# Patient Record
Sex: MALE | Race: WHITE
[De-identification: names, ages, dates, MRNs, and addresses within clinical notes are randomized per-mention and may not be internally consistent; named-entity substitution may affect disease eponyms.]

---

## 2017-01-20 ENCOUNTER — PRIOR ORIGINAL RECORDS (OUTPATIENT)
Dept: OTHER | Age: 67
End: 2017-01-20

## 2017-07-21 ENCOUNTER — LABORATORY ENCOUNTER (OUTPATIENT)
Dept: LAB | Age: 67
End: 2017-07-21
Attending: INTERNAL MEDICINE
Payer: MEDICARE

## 2017-07-21 ENCOUNTER — PRIOR ORIGINAL RECORDS (OUTPATIENT)
Dept: OTHER | Age: 67
End: 2017-07-21

## 2017-07-21 ENCOUNTER — HOSPITAL ENCOUNTER (OUTPATIENT)
Dept: GENERAL RADIOLOGY | Age: 67
Discharge: HOME | End: 2017-07-21
Attending: INTERNAL MEDICINE
Payer: MEDICARE

## 2017-07-21 DIAGNOSIS — E78.00 PURE HYPERCHOLESTEROLEMIA: Primary | ICD-10-CM

## 2017-07-21 DIAGNOSIS — R06.00 DYSPNEA, UNSPECIFIED TYPE: ICD-10-CM

## 2017-07-21 LAB
ALBUMIN SERPL-MCNC: 3.7 G/DL (ref 3.5–4.8)
ALP LIVER SERPL-CCNC: 99 U/L (ref 45–117)
ALT SERPL-CCNC: 35 U/L (ref 17–63)
AST SERPL-CCNC: 33 U/L (ref 15–41)
BILIRUB SERPL-MCNC: 0.6 MG/DL (ref 0.1–2)
BUN BLD-MCNC: 28 MG/DL (ref 8–20)
CALCIUM BLD-MCNC: 9.6 MG/DL (ref 8.3–10.3)
CHLORIDE: 105 MMOL/L (ref 101–111)
CHOLEST SMN-MCNC: 154 MG/DL (ref ?–200)
CO2: 26 MMOL/L (ref 22–32)
CREAT BLD-MCNC: 1.29 MG/DL (ref 0.7–1.3)
ERYTHROCYTE [DISTWIDTH] IN BLOOD BY AUTOMATED COUNT: 17.7 % (ref 11.5–16)
GLUCOSE BLD-MCNC: 168 MG/DL (ref 70–99)
HCT VFR BLD AUTO: 32.5 % (ref 37–53)
HDLC SERPL-MCNC: 22 MG/DL (ref 45–?)
HDLC SERPL: 7 {RATIO} (ref ?–4.97)
HGB BLD-MCNC: 9.5 G/DL (ref 13–17)
LDLC SERPL DIRECT ASSAY-MCNC: 81 MG/DL (ref ?–100)
M PROTEIN MFR SERPL ELPH: 7.5 G/DL (ref 6.1–8.3)
MCH RBC QN AUTO: 31.4 PG (ref 27–33.2)
MCHC RBC AUTO-ENTMCNC: 29.2 G/DL (ref 31–37)
MCV RBC AUTO: 107.3 FL (ref 80–99)
NEUTROPHIL ABS PRELIM: 78 X10 (3) UL (ref 1.3–6.7)
NONHDLC SERPL-MCNC: 132 MG/DL (ref ?–130)
PLATELET # BLD AUTO: 117 10(3)UL (ref 150–450)
POTASSIUM SERPL-SCNC: 4.3 MMOL/L (ref 3.6–5.1)
RBC # BLD AUTO: 3.03 X10(6)UL (ref 3.8–5.8)
RED CELL DISTRIBUTION WIDTH-SD: 69 FL (ref 35.1–46.3)
SODIUM SERPL-SCNC: 139 MMOL/L (ref 136–144)
TRIGLYCERIDES: 485 MG/DL (ref ?–150)
WBC # BLD AUTO: 128.1 X10(3) UL (ref 4–13)

## 2017-07-21 PROCEDURE — 71020 XR CHEST PA + LAT CHEST (CPT=71020): CPT | Performed by: INTERNAL MEDICINE

## 2017-07-21 PROCEDURE — 85007 BL SMEAR W/DIFF WBC COUNT: CPT

## 2017-07-21 PROCEDURE — 80053 COMPREHEN METABOLIC PANEL: CPT

## 2017-07-21 PROCEDURE — 80061 LIPID PANEL: CPT

## 2017-07-21 PROCEDURE — 36415 COLL VENOUS BLD VENIPUNCTURE: CPT

## 2017-07-21 PROCEDURE — 83721 ASSAY OF BLOOD LIPOPROTEIN: CPT

## 2017-07-21 PROCEDURE — 85027 COMPLETE CBC AUTOMATED: CPT

## 2017-07-21 PROCEDURE — 85025 COMPLETE CBC W/AUTO DIFF WBC: CPT

## 2017-07-23 LAB
BAND %: 28 %
BASOPHIL % MANUAL: 3 %
BASOPHIL ABSOLUTE MANUAL: 3.84 X10(3) UL (ref 0–0.1)
BLAST %: 1 %
BLAST ABSOLUTE MANUAL: 1.28 X10(3) UL (ref ?–0.01)
EOSINOPHIL % MANUAL: 1 %
EOSINOPHIL ABSOLUTE MANUAL: 1.28 X10(3) UL (ref 0–0.3)
LYMPHOCYTE % MANUAL: 5 %
LYMPHOCYTE ABSOLUTE MANUAL: 6.41 X10(3) UL (ref 0.9–4)
METAMYELOCYTE %: 9 %
METAMYELOCYTE ABSOLUTE MANUAL: 11.53 X10(3) UL (ref ?–0.01)
MONOCYTE % MANUAL: 9 %
MONOCYTE ABSOLUTE MANUAL: 11.53 X10(3) UL (ref 0.1–0.6)
MYELOCYTE %: 4 %
MYELOCYTE ABSOLUTE MANUAL: 5.12 X10(3) UL (ref ?–0.01)
NEUTROPHIL ABSOLUTE MANUAL: 87.11 X10(3) UL (ref 1.3–6.7)
NEUTROPHILS % MANUAL: 40 %
NRBC CALCULATED: 2
PLATELET MORPHOLOGY: NORMAL
TOTAL CELLS COUNTED: 100
TOXIC GRANULATION: PRESENT

## 2017-07-24 ENCOUNTER — PRIOR ORIGINAL RECORDS (OUTPATIENT)
Dept: OTHER | Age: 67
End: 2017-07-24

## 2017-07-28 ENCOUNTER — OFFICE VISIT (OUTPATIENT)
Dept: HEMATOLOGY/ONCOLOGY | Facility: HOSPITAL | Age: 67
End: 2017-07-28
Attending: SPECIALIST
Payer: MEDICARE

## 2017-07-28 VITALS
RESPIRATION RATE: 18 BRPM | HEIGHT: 70.98 IN | WEIGHT: 281.63 LBS | TEMPERATURE: 97 F | OXYGEN SATURATION: 95 % | HEART RATE: 81 BPM | BODY MASS INDEX: 39.43 KG/M2 | DIASTOLIC BLOOD PRESSURE: 76 MMHG | SYSTOLIC BLOOD PRESSURE: 150 MMHG

## 2017-07-28 DIAGNOSIS — C92.10 CML (CHRONIC MYELOCYTIC LEUKEMIA) (HCC): Primary | ICD-10-CM

## 2017-07-28 DIAGNOSIS — D69.6 THROMBOCYTOPENIA (HCC): ICD-10-CM

## 2017-07-28 DIAGNOSIS — D72.821 MONOCYTOSIS: ICD-10-CM

## 2017-07-28 DIAGNOSIS — C95.90 LEUKEMIA CONSULTATION (HCC): ICD-10-CM

## 2017-07-28 DIAGNOSIS — Z71.89 LEUKEMIA CONSULTATION (HCC): ICD-10-CM

## 2017-07-28 DIAGNOSIS — D72.824 BASOPHILIA: ICD-10-CM

## 2017-07-28 LAB
ALBUMIN SERPL-MCNC: 3.8 G/DL (ref 3.5–4.8)
ALP LIVER SERPL-CCNC: 99 U/L (ref 45–117)
ALT SERPL-CCNC: 39 U/L (ref 17–63)
AST SERPL-CCNC: 43 U/L (ref 15–41)
BAND %: 27 %
BASOPHIL % MANUAL: 1 %
BASOPHIL ABSOLUTE MANUAL: 1.44 X10(3) UL (ref 0–0.1)
BILIRUB SERPL-MCNC: 0.6 MG/DL (ref 0.1–2)
BLAST %: 2 %
BLAST ABSOLUTE MANUAL: 2.87 X10(3) UL (ref ?–0.01)
BUN BLD-MCNC: 21 MG/DL (ref 8–20)
CALCIUM BLD-MCNC: 9.5 MG/DL (ref 8.3–10.3)
CHLORIDE: 106 MMOL/L (ref 101–111)
CO2: 29 MMOL/L (ref 22–32)
CREAT BLD-MCNC: 1.21 MG/DL (ref 0.7–1.3)
EOSINOPHIL % MANUAL: 2 %
EOSINOPHIL ABSOLUTE MANUAL: 2.87 X10(3) UL (ref 0–0.3)
ERYTHROCYTE [DISTWIDTH] IN BLOOD BY AUTOMATED COUNT: 17.3 % (ref 11.5–16)
GLUCOSE BLD-MCNC: 108 MG/DL (ref 70–99)
HCT VFR BLD AUTO: 30.6 % (ref 37–53)
HGB BLD-MCNC: 9.5 G/DL (ref 13–17)
LDH: 794 U/L (ref 84–249)
LYMPHOCYTE % MANUAL: 3 %
LYMPHOCYTE ABSOLUTE MANUAL: 4.31 X10(3) UL (ref 0.9–4)
M PROTEIN MFR SERPL ELPH: 7.5 G/DL (ref 6.1–8.3)
MCH RBC QN AUTO: 31.1 PG (ref 27–33.2)
MCHC RBC AUTO-ENTMCNC: 31 G/DL (ref 31–37)
MCV RBC AUTO: 100.3 FL (ref 80–99)
METAMYELOCYTE %: 7 %
METAMYELOCYTE ABSOLUTE MANUAL: 10.05 X10(3) UL (ref ?–0.01)
MONOCYTE % MANUAL: 3 %
MONOCYTE ABSOLUTE MANUAL: 4.31 X10(3) UL (ref 0.1–0.6)
MYELOCYTE %: 15 %
MYELOCYTE ABSOLUTE MANUAL: 21.53 X10(3) UL (ref ?–0.01)
NEUTROPHIL ABS PRELIM: 85.67 X10 (3) UL (ref 1.3–6.7)
NEUTROPHIL ABSOLUTE MANUAL: 96.15 X10(3) UL (ref 1.3–6.7)
NEUTROPHILS % MANUAL: 40 %
NRBC CALCULATED: 2
PLATELET # BLD AUTO: 106 10(3)UL (ref 150–450)
PLATELET MORPHOLOGY: NORMAL
POTASSIUM SERPL-SCNC: 4.1 MMOL/L (ref 3.6–5.1)
RBC # BLD AUTO: 3.05 X10(6)UL (ref 3.8–5.8)
RED CELL DISTRIBUTION WIDTH-SD: 63.3 FL (ref 35.1–46.3)
SODIUM SERPL-SCNC: 141 MMOL/L (ref 136–144)
TOTAL CELLS COUNTED: 100
URIC ACID: 8.9 MG/DL (ref 2.4–8.7)
WBC # BLD AUTO: 143.5 X10(3) UL (ref 4–13)

## 2017-07-28 PROCEDURE — 99205 OFFICE O/P NEW HI 60 MIN: CPT | Performed by: SPECIALIST

## 2017-07-28 PROCEDURE — 38220 DX BONE MARROW ASPIRATIONS: CPT | Performed by: SPECIALIST

## 2017-07-28 RX ORDER — ALLOPURINOL 300 MG/1
300 TABLET ORAL DAILY
Qty: 30 TABLET | Refills: 0 | Status: SHIPPED | COMMUNITY
Start: 2017-07-28 | End: 2017-09-28

## 2017-07-28 RX ORDER — HYDROXYUREA 500 MG/1
1000 CAPSULE ORAL DAILY
Qty: 60 CAPSULE | Refills: 0 | Status: SHIPPED | COMMUNITY
Start: 2017-07-28 | End: 2017-07-28

## 2017-07-28 RX ORDER — CARVEDILOL 6.25 MG/1
12.5 TABLET ORAL 2 TIMES DAILY WITH MEALS
COMMUNITY
End: 2019-01-22 | Stop reason: ALTCHOICE

## 2017-07-28 RX ORDER — HYDROXYUREA 500 MG/1
500 CAPSULE ORAL DAILY
Qty: 30 CAPSULE | Refills: 0 | Status: SHIPPED | COMMUNITY
Start: 2017-07-28 | End: 2017-09-07 | Stop reason: ALTCHOICE

## 2017-07-28 RX ORDER — MULTIVIT-MIN/FOLIC ACID/LUTEIN 400-250MCG
1 TABLET,CHEWABLE ORAL DAILY
COMMUNITY

## 2017-07-28 NOTE — PROGRESS NOTES
Patient is here today for Consult with Josue Flower. Patient denies pain. Medication list and medical history were reviewed and updated.     Education Record    Learner:  Patient and Family Member Sury Vicente    Disease / Diagnosis: Consult    Aminata / Hakeem Kay

## 2017-07-30 PROBLEM — Z71.89 LEUKEMIA CONSULTATION (HCC): Status: ACTIVE | Noted: 2017-07-30

## 2017-07-30 PROBLEM — C95.90 LEUKEMIA CONSULTATION (HCC): Status: ACTIVE | Noted: 2017-07-30

## 2017-07-30 NOTE — PROGRESS NOTES
[facility] Report of Consultation      Patient Name: Nagi Salinas   YOB: 1950  Medical Record Number: [id number]  Consulting Physician: Dino Parson. Isidoro Dos Santos M.D. Referring Physician: Chris Alcantar M.D.     Date of Consultation: Disp: 30 tablet Rfl: 0   hydroxyurea 500 MG Oral Cap Take 1 capsule (500 mg total) by mouth daily. Disp: 30 capsule Rfl: 0   Venlafaxine HCl ER (EFFEXOR-XR) 150 MG Oral Capsule SR 24 Hr Take 300 mg by mouth daily.  Disp:  Rfl:    Pravastatin Sodium [facility] Conjunctiva clear; sclera anicteric. ENMT                 External nose normal; external ears normal.  Neck                   Supple, without masses.   Hematologic/Lymphatic No cervical, supraclavicular, axillary or inguinal lymphadenopathy; no p RDW 17.3 (H) 11.5 - 16.0 %   RDW-SD 63.3 (H) 35.1 - 46.3 fL   Neutrophil Absolute Prelim 85.67 (H) 1.30 - 6.70 x10 (3) uL   -MANUAL DIFFERENTIAL   Collection Time: 07/28/17  3:56 PM   Result Value Ref Range   Neutrophil Absolute Manual 96.15 (H) 1.30 - 6 BCR/ABL with reflex to quantitative PCR ordered. Bone marrow biopsy with cytogenetics and FISH performed today. Start hydroxyurea 500 mg daily as white blood cell count is greater than 80 K/mcl.  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

## 2017-07-30 NOTE — PROGRESS NOTES
[facility] Bone Marrow Biopsy Procedure Note      Patient Name: Lety Rosario   YOB: 1950  Medical Record Number: [id number]  Attending Physician: Sarah Mack M.D.        Date of Procedure: 7/28/2017      Narrative  Patient p

## 2017-08-01 LAB
ALBUMIN: 3.7 G/DL
ALKALINE PHOSPHATATE(ALK PHOS): 99 IU/L
BILIRUBIN TOTAL: 0.6 MG/DL
BUN: 28 MG/DL
CALCIUM: 9.6 MG/DL
CHLORIDE: 105 MEQ/L
CHOLESTEROL, TOTAL: 154 MG/DL
CREATININE, SERUM: 1.29 MG/DL
GLUCOSE: 168 MG/DL
HDL CHOLESTEROL: 22 MG/DL
HEMATOCRIT: 32.5 %
HEMOGLOBIN: 9.5 G/DL
PLATELETS: 117 K/UL
POTASSIUM, SERUM: 4.3 MEQ/L
PROTEIN, TOTAL: 7.5 G/DL
RED BLOOD COUNT: 3.03 X 10-6/U
SGOT (AST): 33 IU/L
SGPT (ALT): 35 IU/L
SODIUM: 139 MEQ/L
TRIGLYCERIDES: 485 MG/DL
WHITE BLOOD COUNT: 128.1 X 10-3/U

## 2017-08-04 ENCOUNTER — TELEPHONE (OUTPATIENT)
Dept: HEMATOLOGY/ONCOLOGY | Facility: HOSPITAL | Age: 67
End: 2017-08-04

## 2017-08-04 ENCOUNTER — LAB ENCOUNTER (OUTPATIENT)
Dept: LAB | Age: 67
End: 2017-08-04
Attending: FAMILY MEDICINE
Payer: MEDICARE

## 2017-08-04 DIAGNOSIS — D72.824 BASOPHILIC LEUKOCYTOSIS: ICD-10-CM

## 2017-08-04 LAB
ALBUMIN SERPL-MCNC: 3.7 G/DL (ref 3.5–4.8)
ALP LIVER SERPL-CCNC: 96 U/L (ref 45–117)
ALT SERPL-CCNC: 33 U/L (ref 17–63)
AST SERPL-CCNC: 41 U/L (ref 15–41)
BAND %: 26 %
BASOPHIL % MANUAL: 0 %
BASOPHIL ABSOLUTE MANUAL: 0 X10(3) UL (ref 0–0.1)
BILIRUB SERPL-MCNC: 0.6 MG/DL (ref 0.1–2)
BUN BLD-MCNC: 18 MG/DL (ref 8–20)
CALCIUM BLD-MCNC: 9.5 MG/DL (ref 8.3–10.3)
CHLORIDE: 106 MMOL/L (ref 101–111)
CO2: 27 MMOL/L (ref 22–32)
CREAT BLD-MCNC: 1.11 MG/DL (ref 0.7–1.3)
EOSINOPHIL % MANUAL: 0 %
EOSINOPHIL ABSOLUTE MANUAL: 0 X10(3) UL (ref 0–0.3)
ERYTHROCYTE [DISTWIDTH] IN BLOOD BY AUTOMATED COUNT: 17.7 % (ref 11.5–16)
GLUCOSE BLD-MCNC: 132 MG/DL (ref 70–99)
HCT VFR BLD AUTO: 30.3 % (ref 37–53)
HGB BLD-MCNC: 9.1 G/DL (ref 13–17)
LYMPHOCYTE % MANUAL: 2 %
LYMPHOCYTE ABSOLUTE MANUAL: 2.83 X10(3) UL (ref 0.9–4)
M PROTEIN MFR SERPL ELPH: 7.6 G/DL (ref 6.1–8.3)
MCH RBC QN AUTO: 31.3 PG (ref 27–33.2)
MCHC RBC AUTO-ENTMCNC: 30 G/DL (ref 31–37)
MCV RBC AUTO: 104.1 FL (ref 80–99)
METAMYELOCYTE %: 9 %
METAMYELOCYTE ABSOLUTE MANUAL: 12.75 X10(3) UL (ref ?–0.01)
MONOCYTE % MANUAL: 3 %
MONOCYTE ABSOLUTE MANUAL: 4.25 X10(3) UL (ref 0.1–0.6)
MYELOCYTE %: 10 %
MYELOCYTE ABSOLUTE MANUAL: 14.17 X10(3) UL (ref ?–0.01)
NEUTROPHIL ABS PRELIM: 85.46 X10 (3) UL (ref 1.3–6.7)
NEUTROPHIL ABSOLUTE MANUAL: 106.28 X10(3) UL (ref 1.3–6.7)
NEUTROPHILS % MANUAL: 49 %
NRBC CALCULATED: 5
PLATELET # BLD AUTO: 104 10(3)UL (ref 150–450)
PLATELET MORPHOLOGY: NORMAL
POTASSIUM SERPL-SCNC: 4.1 MMOL/L (ref 3.6–5.1)
PROMYELOCYTE %: 1 %
PROMYELOCYTE ABSOLUTE MANUAL: 1.42 X10(3) UL (ref ?–0.01)
RBC # BLD AUTO: 2.91 X10(6)UL (ref 3.8–5.8)
RED CELL DISTRIBUTION WIDTH-SD: 65.4 FL (ref 35.1–46.3)
SODIUM SERPL-SCNC: 141 MMOL/L (ref 136–144)
TOTAL CELLS COUNTED: 100
URIC ACID: 5.5 MG/DL (ref 2.4–8.7)
WBC # BLD AUTO: 141.7 X10(3) UL (ref 4–13)

## 2017-08-04 PROCEDURE — 84550 ASSAY OF BLOOD/URIC ACID: CPT

## 2017-08-04 PROCEDURE — 85027 COMPLETE CBC AUTOMATED: CPT

## 2017-08-04 PROCEDURE — 85007 BL SMEAR W/DIFF WBC COUNT: CPT

## 2017-08-04 PROCEDURE — 80053 COMPREHEN METABOLIC PANEL: CPT

## 2017-08-04 PROCEDURE — 85025 COMPLETE CBC W/AUTO DIFF WBC: CPT

## 2017-08-04 PROCEDURE — 36415 COLL VENOUS BLD VENIPUNCTURE: CPT

## 2017-08-04 NOTE — TELEPHONE ENCOUNTER
MD Tamanna Main, RN             Let patient know that I'm still waiting on results. Make sure he's tolerating hydrea. Would like him to get blood tests today or this weekend. Orders are in [facility] Patient is tolerating Hydrea.  H

## 2017-08-08 ENCOUNTER — TELEPHONE (OUTPATIENT)
Dept: HEMATOLOGY/ONCOLOGY | Facility: HOSPITAL | Age: 67
End: 2017-08-08

## 2017-08-08 PROBLEM — C92.10 CML (CHRONIC MYELOCYTIC LEUKEMIA) (HCC): Status: ACTIVE | Noted: 2017-08-08

## 2017-08-08 LAB
CD19+CD10+: 5.3 %
CD19+CD20+: 86.9 %
CD19+CD25+: <0.1 %
CD19+CD5+: <0.1 %
CD19+KAPPA+: 54.5 %
CD19+LAMBDA+: 25.9 %
CD3+CD2+: 96.8 %
CD3+CD4+: 72.6 %
CD3+CD4+CD8+: <0.1 %
CD3+CD5+: 96.4 %
CD3+CD7+: 98.8 %
CD3+CD8+: 22.6 %
CD4+:CD8+ RATIO: 3.2
KAPPA:LAMBDA RATIO: 2.1

## 2017-08-09 LAB
BCR-ABL1, MAJOR (P210) RESULT: DETECTED
BCRABL1 INTERNATIONAL SCALE(%): 41.75 %
BCRABL1/ABL1 MAJOR(P210) RATIO: 0.49

## 2017-08-16 ENCOUNTER — OFFICE VISIT (OUTPATIENT)
Dept: HEMATOLOGY/ONCOLOGY | Age: 67
End: 2017-08-16
Attending: SPECIALIST
Payer: MEDICARE

## 2017-08-16 ENCOUNTER — APPOINTMENT (OUTPATIENT)
Dept: LAB | Age: 67
End: 2017-08-16
Attending: SPECIALIST
Payer: MEDICARE

## 2017-08-16 VITALS
BODY MASS INDEX: 39 KG/M2 | SYSTOLIC BLOOD PRESSURE: 138 MMHG | DIASTOLIC BLOOD PRESSURE: 72 MMHG | TEMPERATURE: 98 F | OXYGEN SATURATION: 93 % | HEART RATE: 81 BPM | RESPIRATION RATE: 20 BRPM | WEIGHT: 281.63 LBS

## 2017-08-16 DIAGNOSIS — E79.0 HYPERURICEMIA: ICD-10-CM

## 2017-08-16 DIAGNOSIS — N18.30 CKD (CHRONIC KIDNEY DISEASE) STAGE 3, GFR 30-59 ML/MIN (HCC): ICD-10-CM

## 2017-08-16 DIAGNOSIS — C92.10 CML (CHRONIC MYELOCYTIC LEUKEMIA) (HCC): Primary | ICD-10-CM

## 2017-08-16 DIAGNOSIS — C92.10 CML (CHRONIC MYELOCYTIC LEUKEMIA) (HCC): ICD-10-CM

## 2017-08-16 LAB
ALBUMIN SERPL-MCNC: 3.7 G/DL (ref 3.5–4.8)
ALP LIVER SERPL-CCNC: 93 U/L (ref 45–117)
ALT SERPL-CCNC: 33 U/L (ref 17–63)
AST SERPL-CCNC: 37 U/L (ref 15–41)
BAND %: 26 %
BASOPHIL % MANUAL: 0 %
BASOPHIL ABSOLUTE MANUAL: 0 X10(3) UL (ref 0–0.1)
BILIRUB SERPL-MCNC: 0.7 MG/DL (ref 0.1–2)
BLAST %: 1 %
BLAST ABSOLUTE MANUAL: 1.34 X10(3) UL (ref ?–0.01)
BUN BLD-MCNC: 25 MG/DL (ref 8–20)
CALCIUM BLD-MCNC: 9.3 MG/DL (ref 8.3–10.3)
CHLORIDE: 109 MMOL/L (ref 101–111)
CO2: 27 MMOL/L (ref 22–32)
CREAT BLD-MCNC: 1.53 MG/DL (ref 0.7–1.3)
EOSINOPHIL % MANUAL: 0 %
EOSINOPHIL ABSOLUTE MANUAL: 0 X10(3) UL (ref 0–0.3)
ERYTHROCYTE [DISTWIDTH] IN BLOOD BY AUTOMATED COUNT: 18.8 % (ref 11.5–16)
GLUCOSE BLD-MCNC: 135 MG/DL (ref 70–99)
HCT VFR BLD AUTO: 28.1 % (ref 37–53)
HGB BLD-MCNC: 8.9 G/DL (ref 13–17)
LYMPHOCYTE % MANUAL: 10 %
LYMPHOCYTE ABSOLUTE MANUAL: 13.42 X10(3) UL (ref 0.9–4)
M PROTEIN MFR SERPL ELPH: 7.4 G/DL (ref 6.1–8.3)
MCH RBC QN AUTO: 31.7 PG (ref 27–33.2)
MCHC RBC AUTO-ENTMCNC: 31.7 G/DL (ref 31–37)
MCV RBC AUTO: 100 FL (ref 80–99)
METAMYELOCYTE %: 12 %
METAMYELOCYTE ABSOLUTE MANUAL: 16.1 X10(3) UL (ref ?–0.01)
MONOCYTE % MANUAL: 5 %
MONOCYTE ABSOLUTE MANUAL: 6.71 X10(3) UL (ref 0.1–0.6)
MYELOCYTE %: 7 %
MYELOCYTE ABSOLUTE MANUAL: 9.39 X10(3) UL (ref ?–0.01)
NEUTROPHIL ABSOLUTE MANUAL: 85.89 X10(3) UL (ref 1.3–6.7)
NEUTROPHILS % MANUAL: 38 %
NRBC CALCULATED: 4
PLATELET # BLD AUTO: 91 10(3)UL (ref 150–450)
PLATELET MORPHOLOGY: NORMAL
POTASSIUM SERPL-SCNC: 4 MMOL/L (ref 3.6–5.1)
PROMYELOCYTE %: 1 %
PROMYELOCYTE ABSOLUTE MANUAL: 1.34 X10(3) UL (ref ?–0.01)
RBC # BLD AUTO: 2.81 X10(6)UL (ref 3.8–5.8)
RED CELL DISTRIBUTION WIDTH-SD: 67.3 FL (ref 35.1–46.3)
SODIUM SERPL-SCNC: 141 MMOL/L (ref 136–144)
TOTAL CELLS COUNTED: 100
URIC ACID: 7.4 MG/DL (ref 2.4–8.7)
WBC # BLD AUTO: 134.2 X10(3) UL (ref 4–13)

## 2017-08-16 PROCEDURE — 99215 OFFICE O/P EST HI 40 MIN: CPT | Performed by: SPECIALIST

## 2017-08-16 PROCEDURE — 93010 ELECTROCARDIOGRAM REPORT: CPT | Performed by: INTERNAL MEDICINE

## 2017-08-16 PROCEDURE — 93005 ELECTROCARDIOGRAM TRACING: CPT

## 2017-08-16 RX ORDER — HYDROXYUREA 500 MG/1
1000 CAPSULE ORAL DAILY
Qty: 60 CAPSULE | Refills: 0 | Status: SHIPPED | COMMUNITY
Start: 2017-08-16 | End: 2017-09-15 | Stop reason: ALTCHOICE

## 2017-08-16 RX ORDER — ALLOPURINOL 300 MG/1
300 TABLET ORAL DAILY
Qty: 30 TABLET | Refills: 0 | Status: SHIPPED | COMMUNITY
Start: 2017-08-16 | End: 2017-09-07 | Stop reason: ALTCHOICE

## 2017-08-16 RX ORDER — LISINOPRIL 5 MG/1
5 TABLET ORAL DAILY
COMMUNITY
End: 2017-09-20

## 2017-08-16 NOTE — PROGRESS NOTES
Patient is here today for follow up with Dr. Samson Aviles for test results. Patient denies pain. Stated fatigued. Patient is on Hydrea 500mg daily. Medication list and medical history were reviewed and updated.     Education Record    Learner:  Patient    Kaleigh

## 2017-08-17 LAB
ATRIAL RATE: 78 BPM
P AXIS: 46 DEGREES
P-R INTERVAL: 156 MS
Q-T INTERVAL: 398 MS
QRS DURATION: 100 MS
QTC CALCULATION (BEZET): 453 MS
R AXIS: -38 DEGREES
T AXIS: 12 DEGREES
VENTRICULAR RATE: 78 BPM

## 2017-08-21 PROBLEM — N18.30 CKD (CHRONIC KIDNEY DISEASE) STAGE 3, GFR 30-59 ML/MIN (HCC): Status: ACTIVE | Noted: 2017-08-21

## 2017-08-21 NOTE — PROGRESS NOTES
[facility] Report of Consultation      Patient Name: Ramos Keys   YOB: 1950  Medical Record Number: [id number]  Attending Physician: Sivakumar Yeager M.D.      Date of Visit: 8/16/2017       Chief Complaint  Chronic myelogenous daily. Disp: 30 tablet Rfl: 0   MetFORMIN HCl 1000 MG Oral Tab Take 1,000 mg by mouth 2 (two) times daily with meals. Patient stated taking 500mg BID Disp:  Rfl:    multiple vitamin Oral Chew Tab Chew 1 tablet by mouth daily.  Disp:  Rfl:    carvedilol 6.25 and oriented x 3; motor and sensory grossly intact. Psychiatric          Anxious.       Laboratory     Recent Results (from the past 168 hour(s))  -COMP METABOLIC PANEL (14)   Collection Time: 08/16/17  1:42 PM   Result Value Ref Range   Glucose 135 (H) 70 %   Metamyelocyte % 12 %   Myelocyte % 7 %   Promyelocyte % 1 %   Blast % 1 %   NRBC 4    Total Cells Counted 100    RBC Morphology See morphology below (A) Normal   Platelet Morphology Normal Normal   Microcytosis Small (A) (none)   Reactive Lymphs Small of Hematology/Oncology, [address] [facility]

## 2017-08-24 ENCOUNTER — LABORATORY ENCOUNTER (OUTPATIENT)
Dept: LAB | Age: 67
End: 2017-08-24
Attending: FAMILY MEDICINE
Payer: MEDICARE

## 2017-08-24 DIAGNOSIS — E79.0 HYPERURICEMIA: ICD-10-CM

## 2017-08-24 DIAGNOSIS — C92.10 CML (CHRONIC MYELOCYTIC LEUKEMIA) (HCC): ICD-10-CM

## 2017-08-24 LAB
ALBUMIN SERPL-MCNC: 3.6 G/DL (ref 3.5–4.8)
ALP LIVER SERPL-CCNC: 98 U/L (ref 45–117)
ALT SERPL-CCNC: 32 U/L (ref 17–63)
AST SERPL-CCNC: 32 U/L (ref 15–41)
BAND %: 18 %
BASOPHIL % MANUAL: 0 %
BASOPHIL ABSOLUTE MANUAL: 0 X10(3) UL (ref 0–0.1)
BILIRUB SERPL-MCNC: 0.6 MG/DL (ref 0.1–2)
BLAST %: 1 %
BLAST ABSOLUTE MANUAL: 1.29 X10(3) UL (ref ?–0.01)
BUN BLD-MCNC: 18 MG/DL (ref 8–20)
CALCIUM BLD-MCNC: 9.5 MG/DL (ref 8.3–10.3)
CHLORIDE: 106 MMOL/L (ref 101–111)
CO2: 27 MMOL/L (ref 22–32)
CREAT BLD-MCNC: 1.08 MG/DL (ref 0.7–1.3)
EOSINOPHIL % MANUAL: 0 %
EOSINOPHIL ABSOLUTE MANUAL: 0 X10(3) UL (ref 0–0.3)
ERYTHROCYTE [DISTWIDTH] IN BLOOD BY AUTOMATED COUNT: 19.7 % (ref 11.5–16)
GLUCOSE BLD-MCNC: 162 MG/DL (ref 70–99)
HCT VFR BLD AUTO: 28.2 % (ref 37–53)
HGB BLD-MCNC: 8.4 G/DL (ref 13–17)
LYMPHOCYTE % MANUAL: 4 %
LYMPHOCYTE ABSOLUTE MANUAL: 5.17 X10(3) UL (ref 0.9–4)
M PROTEIN MFR SERPL ELPH: 7.3 G/DL (ref 6.1–8.3)
MCH RBC QN AUTO: 31.8 PG (ref 27–33.2)
MCHC RBC AUTO-ENTMCNC: 29.8 G/DL (ref 31–37)
MCV RBC AUTO: 106.8 FL (ref 80–99)
METAMYELOCYTE %: 2 %
METAMYELOCYTE ABSOLUTE MANUAL: 2.58 X10(3) UL (ref ?–0.01)
MONOCYTE % MANUAL: 7 %
MONOCYTE ABSOLUTE MANUAL: 7.75 X10(3) UL (ref 0.1–0.6)
MYELOCYTE %: 8 %
MYELOCYTE ABSOLUTE MANUAL: 9.05 X10(3) UL (ref ?–0.01)
NEUTROPHIL ABS PRELIM: 73.58 X10 (3) UL (ref 1.3–6.7)
NEUTROPHIL ABSOLUTE MANUAL: 91.75 X10(3) UL (ref 1.3–6.7)
NEUTROPHILS % MANUAL: 60 %
NRBC CALCULATED: 1
PLATELET # BLD AUTO: 100 10(3)UL (ref 150–450)
PLATELET MORPHOLOGY: NORMAL
POTASSIUM SERPL-SCNC: 4.4 MMOL/L (ref 3.6–5.1)
RBC # BLD AUTO: 2.64 X10(6)UL (ref 3.8–5.8)
RED CELL DISTRIBUTION WIDTH-SD: 74.5 FL (ref 35.1–46.3)
SODIUM SERPL-SCNC: 139 MMOL/L (ref 136–144)
TOTAL CELLS COUNTED: 91
URIC ACID: 5.5 MG/DL (ref 2.4–8.7)
WBC # BLD AUTO: 117.6 X10(3) UL (ref 4–13)

## 2017-08-24 PROCEDURE — 84550 ASSAY OF BLOOD/URIC ACID: CPT

## 2017-08-24 PROCEDURE — 36415 COLL VENOUS BLD VENIPUNCTURE: CPT

## 2017-08-24 PROCEDURE — 85007 BL SMEAR W/DIFF WBC COUNT: CPT

## 2017-08-24 PROCEDURE — 80053 COMPREHEN METABOLIC PANEL: CPT

## 2017-08-24 PROCEDURE — 85025 COMPLETE CBC W/AUTO DIFF WBC: CPT

## 2017-08-24 PROCEDURE — 85027 COMPLETE CBC AUTOMATED: CPT

## 2017-09-05 ENCOUNTER — TELEPHONE (OUTPATIENT)
Dept: HEMATOLOGY/ONCOLOGY | Facility: HOSPITAL | Age: 67
End: 2017-09-05

## 2017-09-05 NOTE — TELEPHONE ENCOUNTER
Sherren Beth, MD Christell Bonier, RN             I need him to get labs this week. He can go wherever is close. Has he heard anything about his drug?       Telephone call to  patient left detailed message on voicemail (cell and home phone) regarding

## 2017-09-06 ENCOUNTER — TELEPHONE (OUTPATIENT)
Dept: HEMATOLOGY/ONCOLOGY | Facility: HOSPITAL | Age: 67
End: 2017-09-06

## 2017-09-06 NOTE — TELEPHONE ENCOUNTER
Telephone call from patient - stated received chemo pills today. Appointment scheduled for tomorrow at 11am for Chemo Education and lab draw. Patient instructed to bring medication to appointment. PSR notified.

## 2017-09-07 ENCOUNTER — PRIOR ORIGINAL RECORDS (OUTPATIENT)
Dept: OTHER | Age: 67
End: 2017-09-07

## 2017-09-07 ENCOUNTER — OFFICE VISIT (OUTPATIENT)
Dept: HEMATOLOGY/ONCOLOGY | Facility: HOSPITAL | Age: 67
End: 2017-09-07
Attending: NURSE PRACTITIONER
Payer: MEDICARE

## 2017-09-07 VITALS
OXYGEN SATURATION: 96 % | TEMPERATURE: 98 F | DIASTOLIC BLOOD PRESSURE: 79 MMHG | SYSTOLIC BLOOD PRESSURE: 159 MMHG | RESPIRATION RATE: 20 BRPM | BODY MASS INDEX: 39.57 KG/M2 | WEIGHT: 282.63 LBS | HEIGHT: 70.98 IN | HEART RATE: 85 BPM

## 2017-09-07 DIAGNOSIS — Z71.9 ENCOUNTER FOR EDUCATION: Primary | ICD-10-CM

## 2017-09-07 DIAGNOSIS — C92.10 CML (CHRONIC MYELOCYTIC LEUKEMIA) (HCC): ICD-10-CM

## 2017-09-07 LAB
ALBUMIN SERPL-MCNC: 3.5 G/DL (ref 3.5–4.8)
ALP LIVER SERPL-CCNC: 97 U/L (ref 45–117)
ALT SERPL-CCNC: 32 U/L (ref 17–63)
AST SERPL-CCNC: 33 U/L (ref 15–41)
BAND %: 19 %
BASOPHIL % MANUAL: 0 %
BASOPHIL ABSOLUTE MANUAL: 0 X10(3) UL (ref 0–0.1)
BILIRUB SERPL-MCNC: 0.6 MG/DL (ref 0.1–2)
BLAST %: 1 %
BLAST ABSOLUTE MANUAL: 1.11 X10(3) UL (ref ?–0.01)
BUN BLD-MCNC: 17 MG/DL (ref 8–20)
CALCIUM BLD-MCNC: 9.1 MG/DL (ref 8.3–10.3)
CHLORIDE: 107 MMOL/L (ref 101–111)
CO2: 24 MMOL/L (ref 22–32)
CREAT BLD-MCNC: 1.05 MG/DL (ref 0.7–1.3)
EOSINOPHIL % MANUAL: 0 %
EOSINOPHIL ABSOLUTE MANUAL: 0 X10(3) UL (ref 0–0.3)
ERYTHROCYTE [DISTWIDTH] IN BLOOD BY AUTOMATED COUNT: 20.6 % (ref 11.5–16)
GLUCOSE BLD-MCNC: 225 MG/DL (ref 70–99)
HCT VFR BLD AUTO: 29.5 % (ref 37–53)
HGB BLD-MCNC: 9.2 G/DL (ref 13–17)
LYMPHOCYTE % MANUAL: 3 %
LYMPHOCYTE ABSOLUTE MANUAL: 3.33 X10(3) UL (ref 0.9–4)
M PROTEIN MFR SERPL ELPH: 7.3 G/DL (ref 6.1–8.3)
MCH RBC QN AUTO: 32.4 PG (ref 27–33.2)
MCHC RBC AUTO-ENTMCNC: 31.2 G/DL (ref 31–37)
MCV RBC AUTO: 103.9 FL (ref 80–99)
METAMYELOCYTE %: 4 %
METAMYELOCYTE ABSOLUTE MANUAL: 4.44 X10(3) UL (ref ?–0.01)
MONOCYTE % MANUAL: 5 %
MONOCYTE ABSOLUTE MANUAL: 5.55 X10(3) UL (ref 0.1–0.6)
MYELOCYTE %: 5 %
MYELOCYTE ABSOLUTE MANUAL: 5.55 X10(3) UL (ref ?–0.01)
NEUTROPHIL ABS PRELIM: 68.02 X10 (3) UL (ref 1.3–6.7)
NEUTROPHIL ABSOLUTE MANUAL: 90.94 X10(3) UL (ref 1.3–6.7)
NEUTROPHILS % MANUAL: 63 %
NRBC CALCULATED: 1
PLATELET # BLD AUTO: 100 10(3)UL (ref 150–450)
PLATELET MORPHOLOGY: NORMAL
POTASSIUM SERPL-SCNC: 4.1 MMOL/L (ref 3.6–5.1)
RBC # BLD AUTO: 2.84 X10(6)UL (ref 3.8–5.8)
RED CELL DISTRIBUTION WIDTH-SD: 76.6 FL (ref 35.1–46.3)
SODIUM SERPL-SCNC: 140 MMOL/L (ref 136–144)
TOTAL CELLS COUNTED: 100
URIC ACID: 5.5 MG/DL (ref 2.4–8.7)
WBC # BLD AUTO: 110.9 X10(3) UL (ref 4–13)

## 2017-09-07 PROCEDURE — 99215 OFFICE O/P EST HI 40 MIN: CPT | Performed by: NURSE PRACTITIONER

## 2017-09-11 ENCOUNTER — TELEPHONE (OUTPATIENT)
Dept: HEMATOLOGY/ONCOLOGY | Facility: HOSPITAL | Age: 67
End: 2017-09-11

## 2017-09-11 NOTE — TELEPHONE ENCOUNTER
Patient called stated he had questions regarding Medication. Telephone call to patient. Left patient message to return call. To leave detailed message on voicemail regarding questions.

## 2017-09-12 DIAGNOSIS — C92.10 CML (CHRONIC MYELOCYTIC LEUKEMIA) (HCC): Primary | ICD-10-CM

## 2017-09-12 RX ORDER — PROCHLORPERAZINE MALEATE 10 MG
10 TABLET ORAL EVERY 6 HOURS PRN
Qty: 30 TABLET | Refills: 3 | Status: SHIPPED | OUTPATIENT
Start: 2017-09-12 | End: 2021-01-06

## 2017-09-12 RX ORDER — ONDANSETRON HYDROCHLORIDE 8 MG/1
8 TABLET, FILM COATED ORAL EVERY 8 HOURS PRN
Qty: 30 TABLET | Refills: 3 | Status: SHIPPED | OUTPATIENT
Start: 2017-09-12

## 2017-09-12 NOTE — TELEPHONE ENCOUNTER
Patient called stated has had upset stomach and bloating. He also has a headache wants to know if it is ok for Tylenol. Per Dr. Sathish Castillo. Patient to take Pepcid 40mg at bedtime. Ok for Tylenol for headache. Patient to call with update tomorrow.  Matty

## 2017-09-15 ENCOUNTER — OFFICE VISIT (OUTPATIENT)
Dept: HEMATOLOGY/ONCOLOGY | Facility: HOSPITAL | Age: 67
End: 2017-09-15
Attending: NURSE PRACTITIONER
Payer: MEDICARE

## 2017-09-15 ENCOUNTER — TELEPHONE (OUTPATIENT)
Dept: HEMATOLOGY/ONCOLOGY | Facility: HOSPITAL | Age: 67
End: 2017-09-15

## 2017-09-15 ENCOUNTER — PRIOR ORIGINAL RECORDS (OUTPATIENT)
Dept: OTHER | Age: 67
End: 2017-09-15

## 2017-09-15 VITALS
HEART RATE: 88 BPM | BODY MASS INDEX: 39.27 KG/M2 | RESPIRATION RATE: 20 BRPM | SYSTOLIC BLOOD PRESSURE: 141 MMHG | OXYGEN SATURATION: 96 % | DIASTOLIC BLOOD PRESSURE: 56 MMHG | WEIGHT: 280.5 LBS | HEIGHT: 70.98 IN | TEMPERATURE: 98 F

## 2017-09-15 DIAGNOSIS — Z71.89 OTHER SPECIFIED COUNSELING: ICD-10-CM

## 2017-09-15 DIAGNOSIS — R63.0 POOR APPETITE: ICD-10-CM

## 2017-09-15 DIAGNOSIS — F41.9 ANXIETY: ICD-10-CM

## 2017-09-15 DIAGNOSIS — E08.65 DIABETES MELLITUS DUE TO UNDERLYING CONDITION WITH HYPERGLYCEMIA, WITHOUT LONG-TERM CURRENT USE OF INSULIN (HCC): ICD-10-CM

## 2017-09-15 DIAGNOSIS — R53.83 FATIGUE DUE TO TREATMENT: ICD-10-CM

## 2017-09-15 DIAGNOSIS — C92.10 CML (CHRONIC MYELOCYTIC LEUKEMIA) (HCC): Primary | ICD-10-CM

## 2017-09-15 PROBLEM — E11.9 TYPE 2 DIABETES MELLITUS (HCC): Status: ACTIVE | Noted: 2017-09-15

## 2017-09-15 PROCEDURE — 99215 OFFICE O/P EST HI 40 MIN: CPT | Performed by: CLINICAL NURSE SPECIALIST

## 2017-09-15 NOTE — PATIENT INSTRUCTIONS
Take Ondansetron (Zofran) 30 minutes prior to each dose of Tasigna. If you have more nausea between doses of Zofran, you may add Prochlorperazine (Compaziine) to your anti-nausea regimen-you may take as much as every 6 hours as needed for nausea.   Resume

## 2017-09-15 NOTE — PROGRESS NOTES
[facility]    Patient Name: Kole Nguyen   YOB: 1950   Medical Record Number: [id number]   CSN: [id number]   Date of visit: 9/15/2017   Provider: RAINER Klein  Referring Physician: No ref.  provider found    Problem this by 50%. He complains of \"weird\" vision-unable to describe. Denies diplopia. Denies blind spots. Denies reduced visual acuity. He states he feels disoriented. He is more winded walking in the hallway here at the [facility]. He has fatigue. Venlafaxine HCl ER (EFFEXOR-XR) 150 MG Oral Capsule SR 24 Hr, Take 150 mg by mouth daily.   , Disp: , Rfl:   •  Pravastatin Sodium (PRAVACHOL) 40 MG Oral Tab, Take 40 mg by mouth nightly., Disp: , Rfl:     Review of Systems:   As in HPI    Physical Examinat Absolute Prelim 49.42 (H) 1.30 - 6.70 x10 (3) uL   -MANUAL DIFFERENTIAL   Collection Time: 09/15/17 10:47 AM   Result Value Ref Range   Neutrophil Absolute Manual 53.69 (H) 1.30 - 6.70 x10(3) uL   Lymphocyte Absolute Manual 3.08 0.90 - 4.00 x10(3) uL   Mon forgetful. He did have chemo education prior to initiating therapy. The patient will need frequent reenforcement and would benefit from follow up phone calls to help ease his overall anxiety. He lives alone but has support of friends and neighbors.     4

## 2017-09-18 LAB
ALBUMIN: 3.5 G/DL
ALKALINE PHOSPHATATE(ALK PHOS): 97 IU/L
BILIRUBIN TOTAL: 0.6 MG/DL
BUN: 17 MG/DL
CALCIUM: 9.1 MG/DL
CHLORIDE: 107 MEQ/L
CREATININE, SERUM: 1.05 MG/DL
GLUCOSE: 225 MG/DL
POTASSIUM, SERUM: 4.1 MEQ/L
PROTEIN, TOTAL: 7.3 G/DL
SGOT (AST): 33 IU/L
SGPT (ALT): 32 IU/L
SODIUM: 140 MEQ/L

## 2017-09-20 ENCOUNTER — HOSPITAL ENCOUNTER (OUTPATIENT)
Dept: GENERAL RADIOLOGY | Age: 67
Discharge: HOME | End: 2017-09-20
Attending: SPECIALIST
Payer: MEDICARE

## 2017-09-20 ENCOUNTER — APPOINTMENT (OUTPATIENT)
Dept: LAB | Age: 67
End: 2017-09-20
Attending: SPECIALIST
Payer: MEDICARE

## 2017-09-20 ENCOUNTER — OFFICE VISIT (OUTPATIENT)
Dept: HEMATOLOGY/ONCOLOGY | Age: 67
End: 2017-09-20
Attending: NURSE PRACTITIONER
Payer: MEDICARE

## 2017-09-20 VITALS
HEART RATE: 90 BPM | WEIGHT: 277.38 LBS | DIASTOLIC BLOOD PRESSURE: 69 MMHG | RESPIRATION RATE: 20 BRPM | TEMPERATURE: 99 F | OXYGEN SATURATION: 95 % | SYSTOLIC BLOOD PRESSURE: 127 MMHG | BODY MASS INDEX: 39 KG/M2

## 2017-09-20 DIAGNOSIS — R06.02 SHORTNESS OF BREATH: ICD-10-CM

## 2017-09-20 DIAGNOSIS — T45.1X5A CHEMOTHERAPY-INDUCED NAUSEA: ICD-10-CM

## 2017-09-20 DIAGNOSIS — K59.03 DRUG-INDUCED CONSTIPATION: ICD-10-CM

## 2017-09-20 DIAGNOSIS — D64.81 ANEMIA ASSOCIATED WITH CHEMOTHERAPY: ICD-10-CM

## 2017-09-20 DIAGNOSIS — C92.10 CML (CHRONIC MYELOCYTIC LEUKEMIA) (HCC): ICD-10-CM

## 2017-09-20 DIAGNOSIS — E79.0 HYPERURICEMIA: ICD-10-CM

## 2017-09-20 DIAGNOSIS — R11.0 NAUSEA: ICD-10-CM

## 2017-09-20 DIAGNOSIS — N18.30 CKD (CHRONIC KIDNEY DISEASE) STAGE 3, GFR 30-59 ML/MIN (HCC): ICD-10-CM

## 2017-09-20 DIAGNOSIS — F33.0 MILD EPISODE OF RECURRENT MAJOR DEPRESSIVE DISORDER (HCC): ICD-10-CM

## 2017-09-20 DIAGNOSIS — R14.0 ABDOMINAL BLOATING: ICD-10-CM

## 2017-09-20 DIAGNOSIS — C92.10 CML (CHRONIC MYELOCYTIC LEUKEMIA) (HCC): Primary | ICD-10-CM

## 2017-09-20 DIAGNOSIS — R11.0 CHEMOTHERAPY-INDUCED NAUSEA: ICD-10-CM

## 2017-09-20 DIAGNOSIS — T45.1X5A ANEMIA ASSOCIATED WITH CHEMOTHERAPY: ICD-10-CM

## 2017-09-20 LAB
ALBUMIN SERPL-MCNC: 3 G/DL (ref 3.5–4.8)
ALP LIVER SERPL-CCNC: 220 U/L (ref 45–117)
ALT SERPL-CCNC: 48 U/L (ref 17–63)
AST SERPL-CCNC: 26 U/L (ref 15–41)
ATRIAL RATE: 79 BPM
BAND %: 7 %
BASOPHIL % MANUAL: 0 %
BASOPHIL ABSOLUTE MANUAL: 0 X10(3) UL (ref 0–0.1)
BILIRUB SERPL-MCNC: 0.8 MG/DL (ref 0.1–2)
BUN BLD-MCNC: 19 MG/DL (ref 8–20)
CALCIUM BLD-MCNC: 10.5 MG/DL (ref 8.3–10.3)
CHLORIDE: 104 MMOL/L (ref 101–111)
CO2: 28 MMOL/L (ref 22–32)
CREAT BLD-MCNC: 1.05 MG/DL (ref 0.7–1.3)
EOSINOPHIL % MANUAL: 2 %
EOSINOPHIL ABSOLUTE MANUAL: 0.7 X10(3) UL (ref 0–0.3)
ERYTHROCYTE [DISTWIDTH] IN BLOOD BY AUTOMATED COUNT: 20.4 % (ref 11.5–16)
GLUCOSE BLD-MCNC: 189 MG/DL (ref 70–99)
HAV IGM SER QL: 2.1 MG/DL (ref 1.7–3)
HCT VFR BLD AUTO: 24.7 % (ref 37–53)
HGB BLD-MCNC: 7.6 G/DL (ref 13–17)
LYMPHOCYTE % MANUAL: 6 %
LYMPHOCYTE ABSOLUTE MANUAL: 2.11 X10(3) UL (ref 0.9–4)
M PROTEIN MFR SERPL ELPH: 7.6 G/DL (ref 6.1–8.3)
MCH RBC QN AUTO: 31.8 PG (ref 27–33.2)
MCHC RBC AUTO-ENTMCNC: 30.8 G/DL (ref 31–37)
MCV RBC AUTO: 103.3 FL (ref 80–99)
METAMYELOCYTE %: 3 %
METAMYELOCYTE ABSOLUTE MANUAL: 1.05 X10(3) UL (ref ?–0.01)
MONOCYTE % MANUAL: 0 %
MONOCYTE ABSOLUTE MANUAL: 0 X10(3) UL (ref 0.1–0.6)
NEUTROPHIL ABS PRELIM: 27.43 X10 (3) UL (ref 1.3–6.7)
NEUTROPHIL ABSOLUTE MANUAL: 31.24 X10(3) UL (ref 1.3–6.7)
NEUTROPHILS % MANUAL: 82 %
P AXIS: 48 DEGREES
P-R INTERVAL: 146 MS
PHOSPHATE SERPL-MCNC: 2.4 MG/DL (ref 2.5–4.9)
PLATELET # BLD AUTO: 182 10(3)UL (ref 150–450)
PLATELET MORPHOLOGY: NORMAL
POTASSIUM SERPL-SCNC: 4.5 MMOL/L (ref 3.6–5.1)
Q-T INTERVAL: 400 MS
QRS DURATION: 94 MS
QTC CALCULATION (BEZET): 458 MS
R AXIS: -31 DEGREES
RBC # BLD AUTO: 2.39 X10(6)UL (ref 3.8–5.8)
RED CELL DISTRIBUTION WIDTH-SD: 75.9 FL (ref 35.1–46.3)
SODIUM SERPL-SCNC: 138 MMOL/L (ref 136–144)
T AXIS: 58 DEGREES
TOTAL CELLS COUNTED: 100
URIC ACID: 3 MG/DL (ref 2.4–8.7)
VENTRICULAR RATE: 79 BPM
WBC # BLD AUTO: 35.1 X10(3) UL (ref 4–13)

## 2017-09-20 PROCEDURE — 93010 ELECTROCARDIOGRAM REPORT: CPT | Performed by: INTERNAL MEDICINE

## 2017-09-20 PROCEDURE — 71020 XR CHEST PA + LAT CHEST (CPT=71020): CPT | Performed by: SPECIALIST

## 2017-09-20 PROCEDURE — 74000 XR ABDOMEN (1 VIEW) (CPT=74000): CPT | Performed by: SPECIALIST

## 2017-09-20 PROCEDURE — 99215 OFFICE O/P EST HI 40 MIN: CPT | Performed by: SPECIALIST

## 2017-09-20 PROCEDURE — 93005 ELECTROCARDIOGRAM TRACING: CPT

## 2017-09-20 RX ORDER — ALBUTEROL SULFATE 90 UG/1
2 AEROSOL, METERED RESPIRATORY (INHALATION) EVERY 6 HOURS PRN
Qty: 1 INHALER | Refills: 0 | Status: SHIPPED | COMMUNITY
Start: 2017-09-20 | End: 2019-01-22 | Stop reason: ALTCHOICE

## 2017-09-20 NOTE — PROGRESS NOTES
Patient is here today for follow up with Mine Vo for CML. Patient is on Tasigna/Nilotinib therapy. Patient stated neck pain and left shoulder pain is rated a 4 on a scale of 0-10. Stated very fatigued. Short of breath with minimal exertion.  Nausea is im

## 2017-09-21 ENCOUNTER — TELEPHONE (OUTPATIENT)
Dept: HEMATOLOGY/ONCOLOGY | Facility: HOSPITAL | Age: 67
End: 2017-09-21

## 2017-09-21 NOTE — TELEPHONE ENCOUNTER
MD Joe Scott, MARTHA             Let him know he is FOS and that's why he has his abdominal complaints. Suggest MOM. If it doesn't work, then [employer] citrate. Stop allopurinol.  Let us know beginning of next week if SOB better with inhal

## 2017-09-25 ENCOUNTER — TELEPHONE (OUTPATIENT)
Dept: HEMATOLOGY/ONCOLOGY | Facility: HOSPITAL | Age: 67
End: 2017-09-25

## 2017-09-25 NOTE — TELEPHONE ENCOUNTER
MD Remi Bazzi, RN             He was supposed to call us today to let us know if moving his bowels help his belly pain and if inhalers made his breathing better.      Patient stated took Milk of magnesia - had a large amount of st

## 2017-09-26 ENCOUNTER — OFFICE VISIT (OUTPATIENT)
Dept: HEMATOLOGY/ONCOLOGY | Facility: HOSPITAL | Age: 67
End: 2017-09-26
Attending: NURSE PRACTITIONER
Payer: MEDICARE

## 2017-09-26 ENCOUNTER — PRIOR ORIGINAL RECORDS (OUTPATIENT)
Dept: OTHER | Age: 67
End: 2017-09-26

## 2017-09-26 VITALS
BODY MASS INDEX: 38.03 KG/M2 | RESPIRATION RATE: 18 BRPM | OXYGEN SATURATION: 93 % | TEMPERATURE: 98 F | DIASTOLIC BLOOD PRESSURE: 67 MMHG | HEART RATE: 85 BPM | WEIGHT: 271.63 LBS | HEIGHT: 70.98 IN | SYSTOLIC BLOOD PRESSURE: 125 MMHG

## 2017-09-26 DIAGNOSIS — R11.0 NAUSEA: ICD-10-CM

## 2017-09-26 DIAGNOSIS — R06.02 SHORTNESS OF BREATH: ICD-10-CM

## 2017-09-26 DIAGNOSIS — C92.10 CML (CHRONIC MYELOCYTIC LEUKEMIA) (HCC): ICD-10-CM

## 2017-09-26 DIAGNOSIS — T45.1X5A ANEMIA DUE TO ANTINEOPLASTIC CHEMOTHERAPY: ICD-10-CM

## 2017-09-26 DIAGNOSIS — D64.81 ANEMIA DUE TO ANTINEOPLASTIC CHEMOTHERAPY: ICD-10-CM

## 2017-09-26 DIAGNOSIS — R06.00 DYSPNEA ON EXERTION: Primary | ICD-10-CM

## 2017-09-26 DIAGNOSIS — N18.30 CKD (CHRONIC KIDNEY DISEASE) STAGE 3, GFR 30-59 ML/MIN (HCC): ICD-10-CM

## 2017-09-26 LAB
ALBUMIN SERPL-MCNC: 3.2 G/DL (ref 3.5–4.8)
ALP LIVER SERPL-CCNC: 151 U/L (ref 45–117)
ALT SERPL-CCNC: 34 U/L (ref 17–63)
ANTIBODY SCREEN: NEGATIVE
AST SERPL-CCNC: 21 U/L (ref 15–41)
BASOPHILS # BLD AUTO: 0.04 X10(3) UL (ref 0–0.1)
BASOPHILS NFR BLD AUTO: 0.6 %
BETA NATRIURETIC PEPTIDE: 13 PG/ML (ref 2–99)
BILIRUB SERPL-MCNC: 0.8 MG/DL (ref 0.1–2)
BUN BLD-MCNC: 19 MG/DL (ref 8–20)
CALCIUM BLD-MCNC: 9.2 MG/DL (ref 8.3–10.3)
CHLORIDE: 104 MMOL/L (ref 101–111)
CO2: 24 MMOL/L (ref 22–32)
CREAT BLD-MCNC: 1.13 MG/DL (ref 0.7–1.3)
EOSINOPHIL # BLD AUTO: 0.35 X10(3) UL (ref 0–0.3)
EOSINOPHIL NFR BLD AUTO: 4.9 %
ERYTHROCYTE [DISTWIDTH] IN BLOOD BY AUTOMATED COUNT: 19.9 % (ref 11.5–16)
GLUCOSE BLD-MCNC: 213 MG/DL (ref 70–99)
HCT VFR BLD AUTO: 27.3 % (ref 37–53)
HGB BLD-MCNC: 8.6 G/DL (ref 13–17)
IMMATURE GRANULOCYTE COUNT: 0.13 X10(3) UL (ref 0–1)
IMMATURE GRANULOCYTE RATIO %: 1.8 %
LYMPHOCYTES # BLD AUTO: 1.02 X10(3) UL (ref 0.9–4)
LYMPHOCYTES NFR BLD AUTO: 14.2 %
M PROTEIN MFR SERPL ELPH: 7.8 G/DL (ref 6.1–8.3)
MCH RBC QN AUTO: 32 PG (ref 27–33.2)
MCHC RBC AUTO-ENTMCNC: 31.5 G/DL (ref 31–37)
MCV RBC AUTO: 101.5 FL (ref 80–99)
MONOCYTES # BLD AUTO: 0.48 X10(3) UL (ref 0.1–0.6)
MONOCYTES NFR BLD AUTO: 6.7 %
NEUTROPHIL ABS PRELIM: 5.15 X10 (3) UL (ref 1.3–6.7)
NEUTROPHILS # BLD AUTO: 5.15 X10(3) UL (ref 1.3–6.7)
NEUTROPHILS NFR BLD AUTO: 71.8 %
PLATELET # BLD AUTO: 297 10(3)UL (ref 150–450)
PLATELET MORPHOLOGY: NORMAL
POTASSIUM SERPL-SCNC: 4.4 MMOL/L (ref 3.6–5.1)
RBC # BLD AUTO: 2.69 X10(6)UL (ref 3.8–5.8)
RED CELL DISTRIBUTION WIDTH-SD: 74.1 FL (ref 35.1–46.3)
RH BLOOD TYPE: POSITIVE
SODIUM SERPL-SCNC: 136 MMOL/L (ref 136–144)
WBC # BLD AUTO: 7.2 X10(3) UL (ref 4–13)

## 2017-09-26 PROCEDURE — 86850 RBC ANTIBODY SCREEN: CPT

## 2017-09-26 PROCEDURE — 85025 COMPLETE CBC W/AUTO DIFF WBC: CPT

## 2017-09-26 PROCEDURE — 86900 BLOOD TYPING SEROLOGIC ABO: CPT

## 2017-09-26 PROCEDURE — 36415 COLL VENOUS BLD VENIPUNCTURE: CPT

## 2017-09-26 PROCEDURE — 83880 ASSAY OF NATRIURETIC PEPTIDE: CPT

## 2017-09-26 PROCEDURE — 80053 COMPREHEN METABOLIC PANEL: CPT

## 2017-09-26 PROCEDURE — 99214 OFFICE O/P EST MOD 30 MIN: CPT | Performed by: NURSE PRACTITIONER

## 2017-09-26 PROCEDURE — 86901 BLOOD TYPING SEROLOGIC RH(D): CPT

## 2017-09-26 RX ORDER — ACETAMINOPHEN 325 MG/1
650 TABLET ORAL ONCE
Status: CANCELLED | OUTPATIENT
Start: 2017-09-26

## 2017-09-26 RX ORDER — DIPHENHYDRAMINE HCL 25 MG
25 CAPSULE ORAL ONCE
Status: CANCELLED | OUTPATIENT
Start: 2017-09-26

## 2017-09-26 NOTE — PROGRESS NOTES
CC: shortness of breath. HPI:   Rudy Castaneda is a(n) 69year old male .   He is followed by Dr. Nestor Ordonez for new diagnosis of CML that had been found incidental by Dr. Efe Pappas with routine blood counts that were done in preparation for heart catheteri °C)   Pain Score 0       PE  General: Patient is alert and oriented x 3, not in acute distress. HEENT: EOMs intact. PERRL. Oropharynx is clear. Neck: No JVD. No palpable lymphadenopathy. Neck is supple. Chest: Clear to auscultation.   Heart: Regular rat Neutrophil % 71.8 %   Lymphocyte % 14.2 %   Monocyte % 6.7 %   Eosinophil % 4.9 %   Basophil % 0.6 %   Immature Granulocyte % 1.8 %   -ABORH (BLOOD TYPE)   Collection Time: 09/26/17 11:28 AM   Result Value Ref Range   ABO BLOOD TYPE A    RH BLOOD TYPE Po patient and family,       90 % of that time was spent addressing the patients emotional well being, plan of care,  Reviewing lab/test results.   Patient/family verbalized understanding of plan of care        RAINER Morales  9/26/2017

## 2017-09-26 NOTE — PROGRESS NOTES
Pt came in for a blood transfusion today. Pt was given written and verbal education on how we give blood. Pt was type and screened, in addition to having another CBC drawn today. When the CBC results came back, his hgb was 8.6.   He is still feeling some

## 2017-09-27 ENCOUNTER — PRIOR ORIGINAL RECORDS (OUTPATIENT)
Dept: OTHER | Age: 67
End: 2017-09-27

## 2017-09-27 ENCOUNTER — HOSPITAL ENCOUNTER (OUTPATIENT)
Dept: CV DIAGNOSTICS | Facility: HOSPITAL | Age: 67
Discharge: HOME | End: 2017-09-27
Attending: SPECIALIST
Payer: MEDICARE

## 2017-09-27 DIAGNOSIS — R06.02 SHORTNESS OF BREATH: ICD-10-CM

## 2017-09-27 DIAGNOSIS — C92.10 CML (CHRONIC MYELOCYTIC LEUKEMIA) (HCC): ICD-10-CM

## 2017-09-27 DIAGNOSIS — R11.0 NAUSEA: ICD-10-CM

## 2017-09-27 DIAGNOSIS — N18.30 CKD (CHRONIC KIDNEY DISEASE) STAGE 3, GFR 30-59 ML/MIN (HCC): ICD-10-CM

## 2017-09-27 PROCEDURE — 93306 TTE W/DOPPLER COMPLETE: CPT | Performed by: SPECIALIST

## 2017-09-28 ENCOUNTER — PRIOR ORIGINAL RECORDS (OUTPATIENT)
Dept: OTHER | Age: 67
End: 2017-09-28

## 2017-09-28 ENCOUNTER — TELEPHONE (OUTPATIENT)
Dept: HEMATOLOGY/ONCOLOGY | Facility: HOSPITAL | Age: 67
End: 2017-09-28

## 2017-09-28 NOTE — PROGRESS NOTES
[facility] Report of Consultation      Patient Name: Ida Baer   YOB: 1950  Medical Record Number: [id number]  Attending Physician: Marvin Zavala M.D.      Date of Visit: 9/20/2017      Chief Complaint  Chronic myelogenous MI on stress test 11/2016. Past Surgical History (historical data, reviewed)  Cholecystectomy; excisional cervical lymph node biopsy. Family History (historical data, reviewed)  Brother with sarcoma.      Social History (historical data, reviewed)  P Constitutional      Well developed, well nourished. Appears close to chronological age. No apparent distress. Head                   Normocephalic and atraumatic. Eyes                  Conjunctiva clear; sclera anicteric.   ENMT                 Externa -MANUAL DIFFERENTIAL   Collection Time: 09/15/17 10:47 AM   Result Value Ref Range   Neutrophil Absolute Manual 53.69 (H) 1.30 - 6.70 x10(3) uL   Lymphocyte Absolute Manual 3.08 0.90 - 4.00 x10(3) uL   Monocyte Absolute Manual 8.21 (H) 0.10 - 0.60 x10(3) Result Value Ref Range   WBC 35.1 (H) 4.0 - 13.0 x10(3) uL   RBC 2.39 (L) 3.80 - 5.80 x10(6)uL   HGB 7.6 (L) 13.0 - 17.0 g/dL   HCT 24.7 (L) 37.0 - 53.0 %   .0 150.0 - 450.0 10(3)uL   .3 (H) 80.0 - 99.0 fL   MCH 31.8 27.0 - 33.2 pg   MCHC 3 rule out pleural effusion which can occur with nilotinib. Check echocardiogram to rule out pericardial effusion which can occur with nilotinib.  Patient advised to follow up quickly with his cardiologist.     5.   Abdominal discomfort and bloating: I suspec

## 2017-09-28 NOTE — TELEPHONE ENCOUNTER
MD Jc Archuleta RN             Let patient know that echo showed no fluid around the heart. Leukemia medicine is not the problem. I think his shortness of breath is cardiac related.  He needs to get back to his cardiologist. He shou

## 2017-09-30 ENCOUNTER — SNF/IP PROF CHARGE ONLY (OUTPATIENT)
Dept: HEMATOLOGY/ONCOLOGY | Facility: HOSPITAL | Age: 67
End: 2017-09-30

## 2017-09-30 DIAGNOSIS — C92.10 CML (CHRONIC MYELOCYTIC LEUKEMIA) (HCC): ICD-10-CM

## 2017-09-30 PROCEDURE — G9678 ONCOLOGY CARE MODEL SERVICE: HCPCS | Performed by: SPECIALIST

## 2017-10-01 ENCOUNTER — PRIOR ORIGINAL RECORDS (OUTPATIENT)
Dept: OTHER | Age: 67
End: 2017-10-01

## 2017-10-02 ENCOUNTER — TELEPHONE (OUTPATIENT)
Dept: HEMATOLOGY/ONCOLOGY | Facility: HOSPITAL | Age: 67
End: 2017-10-02

## 2017-10-03 ENCOUNTER — PRIOR ORIGINAL RECORDS (OUTPATIENT)
Dept: OTHER | Age: 67
End: 2017-10-03

## 2017-10-10 NOTE — H&P
Marie Mary  : 1950  ACCOUNT:  [id number]  [phone]  PCP: Dr. Richelle Light    TODAY'S DATE: 09/15/2017  DICTATED BY:  Monique Atkins M.D.]    CHIEF COMPLAINT: [Followup of Aneurysm, dilated aortic root, Followup of Cardiomyopathy and idi ENT: mucosa pink and moist. NECK: jugular venous pressure not elevated. RESP: respirations with normal rate and rhythm, clear to auscultation. GI: no masses, tenderness or hepatosplenomegaly, rectal deferred. MS: adequate gait for exercise testing/testing.

## 2017-10-11 ENCOUNTER — OFFICE VISIT (OUTPATIENT)
Dept: HEMATOLOGY/ONCOLOGY | Age: 67
End: 2017-10-11
Attending: NURSE PRACTITIONER
Payer: MEDICARE

## 2017-10-11 VITALS
BODY MASS INDEX: 37 KG/M2 | TEMPERATURE: 98 F | WEIGHT: 268.63 LBS | RESPIRATION RATE: 20 BRPM | SYSTOLIC BLOOD PRESSURE: 150 MMHG | DIASTOLIC BLOOD PRESSURE: 82 MMHG | HEART RATE: 76 BPM | OXYGEN SATURATION: 97 %

## 2017-10-11 DIAGNOSIS — C92.10 CML (CHRONIC MYELOCYTIC LEUKEMIA) (HCC): Primary | ICD-10-CM

## 2017-10-11 DIAGNOSIS — D64.81 ANEMIA ASSOCIATED WITH CHEMOTHERAPY: ICD-10-CM

## 2017-10-11 DIAGNOSIS — D64.81 ANEMIA DUE TO ANTINEOPLASTIC CHEMOTHERAPY: ICD-10-CM

## 2017-10-11 DIAGNOSIS — F32.89 OTHER DEPRESSION: ICD-10-CM

## 2017-10-11 DIAGNOSIS — T45.1X5A ANEMIA DUE TO ANTINEOPLASTIC CHEMOTHERAPY: ICD-10-CM

## 2017-10-11 DIAGNOSIS — R06.00 DYSPNEA ON EXERTION: ICD-10-CM

## 2017-10-11 DIAGNOSIS — E79.0 HYPERURICEMIA: ICD-10-CM

## 2017-10-11 DIAGNOSIS — R06.02 SHORTNESS OF BREATH: ICD-10-CM

## 2017-10-11 DIAGNOSIS — T45.1X5A ANEMIA ASSOCIATED WITH CHEMOTHERAPY: ICD-10-CM

## 2017-10-11 DIAGNOSIS — N18.30 CKD (CHRONIC KIDNEY DISEASE) STAGE 3, GFR 30-59 ML/MIN (HCC): ICD-10-CM

## 2017-10-11 PROCEDURE — 99215 OFFICE O/P EST HI 40 MIN: CPT | Performed by: SPECIALIST

## 2017-10-11 NOTE — PROGRESS NOTES
Patient is here today for follow up with Cande Mendez for CML. Patient stated pain in abdomen is rated a 2 on a scale of 0-10. Patient is on Nilotinib 300mg BID. Stated rash on neck and chest. Fatigue not relieved by rest. Shortness of breath.  Patient has a

## 2017-10-13 ENCOUNTER — HOSPITAL ENCOUNTER (OUTPATIENT)
Dept: INTERVENTIONAL RADIOLOGY/VASCULAR | Facility: HOSPITAL | Age: 67
Discharge: HOME | End: 2017-10-13
Attending: INTERNAL MEDICINE | Admitting: INTERNAL MEDICINE
Payer: MEDICARE

## 2017-10-13 VITALS
DIASTOLIC BLOOD PRESSURE: 75 MMHG | OXYGEN SATURATION: 97 % | RESPIRATION RATE: 23 BRPM | BODY MASS INDEX: 38.08 KG/M2 | TEMPERATURE: 98 F | SYSTOLIC BLOOD PRESSURE: 150 MMHG | WEIGHT: 272 LBS | HEIGHT: 71 IN | HEART RATE: 70 BPM

## 2017-10-13 DIAGNOSIS — R06.00 DYSPNEA: ICD-10-CM

## 2017-10-13 PROCEDURE — 99152 MOD SED SAME PHYS/QHP 5/>YRS: CPT

## 2017-10-13 PROCEDURE — 82962 GLUCOSE BLOOD TEST: CPT

## 2017-10-13 PROCEDURE — 4A023N8 MEASUREMENT OF CARDIAC SAMPLING AND PRESSURE, BILATERAL, PERCUTANEOUS APPROACH: ICD-10-PCS | Performed by: INTERNAL MEDICINE

## 2017-10-13 PROCEDURE — 93460 R&L HRT ART/VENTRICLE ANGIO: CPT

## 2017-10-13 PROCEDURE — B2111ZZ FLUOROSCOPY OF MULTIPLE CORONARY ARTERIES USING LOW OSMOLAR CONTRAST: ICD-10-PCS | Performed by: INTERNAL MEDICINE

## 2017-10-13 PROCEDURE — 99153 MOD SED SAME PHYS/QHP EA: CPT

## 2017-10-13 PROCEDURE — B2151ZZ FLUOROSCOPY OF LEFT HEART USING LOW OSMOLAR CONTRAST: ICD-10-PCS | Performed by: INTERNAL MEDICINE

## 2017-10-13 RX ORDER — SODIUM CHLORIDE 9 MG/ML
INJECTION, SOLUTION INTRAVENOUS CONTINUOUS
Status: DISCONTINUED | OUTPATIENT
Start: 2017-10-13 | End: 2017-10-13

## 2017-10-13 RX ORDER — FUROSEMIDE 20 MG/1
20 TABLET ORAL DAILY
Qty: 30 TABLET | Refills: 6 | Status: SHIPPED | OUTPATIENT
Start: 2017-10-13 | End: 2019-01-22 | Stop reason: ALTCHOICE

## 2017-10-13 RX ORDER — MIDAZOLAM HYDROCHLORIDE 1 MG/ML
INJECTION INTRAMUSCULAR; INTRAVENOUS
Status: COMPLETED
Start: 2017-10-13 | End: 2017-10-13

## 2017-10-13 RX ORDER — FUROSEMIDE 20 MG/1
20 TABLET ORAL DAILY
Qty: 30 TABLET | Refills: 6 | Status: SHIPPED | OUTPATIENT
Start: 2017-10-13 | End: 2017-10-13

## 2017-10-13 RX ORDER — LISINOPRIL 10 MG/1
5 TABLET ORAL DAILY
Qty: 30 TABLET | Refills: 6 | Status: SHIPPED | OUTPATIENT
Start: 2017-10-13 | End: 2017-10-13

## 2017-10-13 RX ORDER — HEPARIN SODIUM 5000 [USP'U]/ML
INJECTION, SOLUTION INTRAVENOUS; SUBCUTANEOUS
Status: DISCONTINUED
Start: 2017-10-13 | End: 2017-10-13 | Stop reason: WASHOUT

## 2017-10-13 RX ORDER — HEPARIN SODIUM 5000 [USP'U]/ML
INJECTION, SOLUTION INTRAVENOUS; SUBCUTANEOUS
Status: COMPLETED
Start: 2017-10-13 | End: 2017-10-13

## 2017-10-13 RX ORDER — LIDOCAINE HYDROCHLORIDE 10 MG/ML
INJECTION, SOLUTION INFILTRATION; PERINEURAL
Status: DISCONTINUED
Start: 2017-10-13 | End: 2017-10-13 | Stop reason: WASHOUT

## 2017-10-13 RX ORDER — LIDOCAINE HYDROCHLORIDE 10 MG/ML
INJECTION, SOLUTION INFILTRATION; PERINEURAL
Status: COMPLETED
Start: 2017-10-13 | End: 2017-10-13

## 2017-10-13 RX ORDER — LISINOPRIL 5 MG/1
5 TABLET ORAL DAILY
Qty: 30 TABLET | Refills: 6 | Status: SHIPPED | OUTPATIENT
Start: 2017-10-13 | End: 2019-08-22 | Stop reason: DRUGHIGH

## 2017-10-13 RX ADMIN — SODIUM CHLORIDE: 9 INJECTION, SOLUTION INTRAVENOUS at 07:40:00

## 2017-10-13 RX ADMIN — SODIUM CHLORIDE: 9 INJECTION, SOLUTION INTRAVENOUS at 14:45:00

## 2017-10-13 NOTE — PROCEDURES
[facility]  Angiogram Procedure Note    Sandeep Kathleenleandro Location: Cath Lab    CSN [id number] MRN [id number]   Admission Date 10/13/2017 Procedure Date 10/13/2017   Attending Physician Esteban Andrew MD Procedure Physician Siri Lau MD     Pre- exam from 1406 - 14:33 PM    Hemodynamics:  1. Right atrial pressure 28/19/17 mmHg  2. Right ventricular pressure 50/14/19 mmHg  3. Pulmonary arterial pressure 51/12/31 mmHg  4. Pulmonary capillary wedge pressure 28/27/23 mmHg  5.   Aortic pressure 161/

## 2017-10-13 NOTE — PLAN OF CARE
Post right and left heart cath via right groin with Dr. Renetta Aguilar. Site is soft with no hematoma noted. Dressing is clean, dry and intact. Patient unhappy all day due to major delays in schedule.   Dr. Renetta Aguilar at bedside a few times to check in with patient,

## 2017-10-13 NOTE — H&P
History & Physical Examination    Patient Name: Butch Rees  MRN: [id number]  [facility]: [id number]  YOB: 1950    Diagnosis: Dyspnea, coronary artery disease, CML    Present Illness: Cardiac catheterization      Prescriptions Prior to Admission: Anxiety state, unspecified    • Depression    • Diabetes ([facility] [address]    • Esophageal reflux    • Other and unspecified hyperlipidemia    • Unspecified essential hypertension      Past Surgical History:  12/6/14: CHOLECYSTECTOMY  No date: COLONOSCOPY  No date: OT

## 2017-10-15 NOTE — PROGRESS NOTES
[facility] [place] Report of Consultation      Patient Name: Agustín Logan   YOB: 1950  Medical Record Number: [id number]  Attending Physician: Rasheeda Medrano M.D.      Date of Visit: 10/11/2017      Chief Complaint  Chronic myelogenous History (historical data, reviewed)  Previous tobacco user but quit 1997; denies alcohol use. Current Medications     aspirin 81 MG Oral Chew Tab Chew 81 mg by mouth daily. Took 4 this a.m.   Disp:  Rfl:    Albuterol Sulfate  (90 Base) MCG/ACT External nose normal; external ears normal.  Neck                   Supple. Hematologic/Lymphatic No cervical, supraclavicular, axillary lymphadenopathy.   Respiratory          Normal effort; no respiratory distress; diffusely decreased breath sounds - no Basophil Absolute 0.02 0.00 - 0.10 x10(3) uL   Immature Granulocyte Absolute 0.01 0.00 - 1.00 x10(3) uL   Neutrophil % 64.6 %   Lymphocyte % 22.2 %   Monocyte % 7.0 %   Eosinophil % 5.5 %   Basophil % 0.5 %   Immature Granulocyte % 0.2 %   -POCT GLUCOSE

## 2017-10-16 ENCOUNTER — PRIOR ORIGINAL RECORDS (OUTPATIENT)
Dept: OTHER | Age: 67
End: 2017-10-16

## 2017-10-27 ENCOUNTER — PRIOR ORIGINAL RECORDS (OUTPATIENT)
Dept: OTHER | Age: 67
End: 2017-10-27

## 2017-10-31 ENCOUNTER — SNF/IP PROF CHARGE ONLY (OUTPATIENT)
Dept: HEMATOLOGY/ONCOLOGY | Facility: HOSPITAL | Age: 67
End: 2017-10-31

## 2017-10-31 DIAGNOSIS — C92.10 CML (CHRONIC MYELOCYTIC LEUKEMIA) (HCC): ICD-10-CM

## 2017-10-31 PROCEDURE — G9678 ONCOLOGY CARE MODEL SERVICE: HCPCS | Performed by: SPECIALIST

## 2017-11-06 NOTE — PROGRESS NOTES
[facility] Report of Consultation      Patient Name: Omid Wiggins   YOB: 1950  Medical Record Number: [id number]  Attending Physician: Puja Cortez M.D.      Date of Visit: 11/8/2017      Chief Complaint  Chronic myelogenous with LVEF 52%; depression/anxiety; diabetes mellitus; hypertension; hyperlipidemia; GERD; coronary artery disease with evidence of MI on stress test 11/2016.     Past Surgical History (historical data, reviewed)  Cholecystectomy; excisional cervical lymph n Position: Sitting, Cuff Size: large)   Pulse 81   Temp 97.6 °F (36.4 °C) (Tympanic)   Resp 20   Wt 120.1 kg (264 lb 12.8 oz)   SpO2 94%   BMI 36.93 kg/m²     Physical Examination   Constitutional      Well developed, well nourished.  Appears close to chrono 15.4 11.5 - 16.0 %   RDW-SD 51.2 (H) 35.1 - 46.3 fL   Neutrophil Absolute Prelim 3.71 1.30 - 6.70 x10 (3) uL   Neutrophil Absolute 3.71 1.30 - 6.70 x10(3) uL   Lymphocyte Absolute 1.20 0.90 - 4.00 x10(3) uL   Monocyte Absolute 0.24 0.10 - 0.60 x10(3) uL

## 2017-11-08 ENCOUNTER — OFFICE VISIT (OUTPATIENT)
Dept: HEMATOLOGY/ONCOLOGY | Age: 67
End: 2017-11-08
Attending: NURSE PRACTITIONER
Payer: MEDICARE

## 2017-11-08 VITALS
WEIGHT: 264.81 LBS | DIASTOLIC BLOOD PRESSURE: 76 MMHG | TEMPERATURE: 98 F | HEART RATE: 81 BPM | BODY MASS INDEX: 37 KG/M2 | OXYGEN SATURATION: 94 % | RESPIRATION RATE: 20 BRPM | SYSTOLIC BLOOD PRESSURE: 119 MMHG

## 2017-11-08 DIAGNOSIS — R06.02 SHORTNESS OF BREATH: ICD-10-CM

## 2017-11-08 DIAGNOSIS — D64.81 ANEMIA DUE TO ANTINEOPLASTIC CHEMOTHERAPY: ICD-10-CM

## 2017-11-08 DIAGNOSIS — R06.00 DYSPNEA ON EXERTION: ICD-10-CM

## 2017-11-08 DIAGNOSIS — C92.10 CML (CHRONIC MYELOCYTIC LEUKEMIA) (HCC): Primary | ICD-10-CM

## 2017-11-08 DIAGNOSIS — G47.10 HYPERSOMNIA: ICD-10-CM

## 2017-11-08 DIAGNOSIS — N18.30 CKD (CHRONIC KIDNEY DISEASE) STAGE 3, GFR 30-59 ML/MIN (HCC): ICD-10-CM

## 2017-11-08 DIAGNOSIS — R14.0 ABDOMINAL BLOATING: ICD-10-CM

## 2017-11-08 DIAGNOSIS — T45.1X5A ANEMIA DUE TO ANTINEOPLASTIC CHEMOTHERAPY: ICD-10-CM

## 2017-11-08 PROCEDURE — 99215 OFFICE O/P EST HI 40 MIN: CPT | Performed by: SPECIALIST

## 2017-11-08 NOTE — PROGRESS NOTES
Patient is here today for follow up with Dr. Júnior Abreu. Patient stated abdominal pain is rated a 4 on a scale of 0-10. Short of breath -following up with Cardiology. Is on Nilotinib 300mg BID. Intermittent nausea OK with antiemetics. Fatigued.  Medication li

## 2017-11-15 ENCOUNTER — TELEPHONE (OUTPATIENT)
Dept: HEMATOLOGY/ONCOLOGY | Facility: HOSPITAL | Age: 67
End: 2017-11-15

## 2017-11-15 NOTE — TELEPHONE ENCOUNTER
MD Raymond Covington RN   Caller: Unspecified (Today, 11:08 AM)             He needs to be clearer.  If he doesn't think there is much difference off the medication he should restart and we won't blame the medication for his problems

## 2017-11-30 ENCOUNTER — SNF/IP PROF CHARGE ONLY (OUTPATIENT)
Dept: HEMATOLOGY/ONCOLOGY | Facility: HOSPITAL | Age: 67
End: 2017-11-30

## 2017-11-30 DIAGNOSIS — C92.10 CML (CHRONIC MYELOCYTIC LEUKEMIA) (HCC): ICD-10-CM

## 2017-11-30 PROCEDURE — G9678 ONCOLOGY CARE MODEL SERVICE: HCPCS | Performed by: SPECIALIST

## 2017-12-03 NOTE — PROGRESS NOTES
[facility] Report of Consultation      Patient Name: Nguyen Dos Santos   YOB: 1950  Medical Record Number: [id number]  Attending Physician: Tawnya Vasquez M.D.      Date of Visit: 12/6/2017      Chief Complaint  Chronic myelogenous reviewed)  Previous tobacco user but quit 1997; denies alcohol use. Current Medications     lisinopril 5 MG Oral Tab Take 1 tablet (5 mg total) by mouth daily.  Disp: 30 tablet Rfl: 6   furosemide 20 MG Oral Tab Take 1 tablet (20 mg total) by mouth da SpO2 93%   BMI 36.32 kg/m²     Physical Examination   Constitutional      Well developed, well nourished. Appears close to chronological age. No apparent distress. Head                   Normocephalic and atraumatic.   Eyes                  Conjunctiva c 4. 61 1.30 - 6.70 x10(3) uL   Lymphocyte Absolute 1.39 0.90 - 4.00 x10(3) uL   Monocyte Absolute 0.32 0.10 - 0.60 x10(3) uL   Eosinophil Absolute 0.33 (H) 0.00 - 0.30 x10(3) uL   Basophil Absolute 0.04 0.00 - 0.10 x10(3) uL   Immature Granulocyte Absolute 0

## 2017-12-04 ENCOUNTER — PRIOR ORIGINAL RECORDS (OUTPATIENT)
Dept: OTHER | Age: 67
End: 2017-12-04

## 2017-12-06 ENCOUNTER — OFFICE VISIT (OUTPATIENT)
Dept: HEMATOLOGY/ONCOLOGY | Age: 67
End: 2017-12-06
Attending: NURSE PRACTITIONER
Payer: MEDICARE

## 2017-12-06 VITALS
DIASTOLIC BLOOD PRESSURE: 81 MMHG | WEIGHT: 260.38 LBS | OXYGEN SATURATION: 93 % | SYSTOLIC BLOOD PRESSURE: 152 MMHG | TEMPERATURE: 98 F | RESPIRATION RATE: 20 BRPM | BODY MASS INDEX: 36 KG/M2 | HEART RATE: 76 BPM

## 2017-12-06 DIAGNOSIS — R10.9 ABDOMINAL DISCOMFORT: ICD-10-CM

## 2017-12-06 DIAGNOSIS — R06.00 DYSPNEA ON EXERTION: ICD-10-CM

## 2017-12-06 DIAGNOSIS — R45.84 ANHEDONIA: ICD-10-CM

## 2017-12-06 DIAGNOSIS — C92.10 CML (CHRONIC MYELOCYTIC LEUKEMIA) (HCC): Primary | ICD-10-CM

## 2017-12-06 DIAGNOSIS — R06.02 SHORTNESS OF BREATH: ICD-10-CM

## 2017-12-06 PROCEDURE — 99215 OFFICE O/P EST HI 40 MIN: CPT | Performed by: SPECIALIST

## 2017-12-06 NOTE — PROGRESS NOTES
Patient is here today for follow up with Dr. Shy Ramirez for [facility]. Patient stated intermittent pain in his abdomen and constant pain in his legs rated a 5 on a scale of 0-10. Patient in on Nilotinib therapy, stated rash on scalp and chest. Fatigued.  Appetite is

## 2017-12-12 ENCOUNTER — APPOINTMENT (OUTPATIENT)
Dept: LAB | Age: 67
End: 2017-12-12
Attending: SPECIALIST
Payer: MEDICARE

## 2017-12-12 DIAGNOSIS — C92.10 CML (CHRONIC MYELOCYTIC LEUKEMIA) (HCC): ICD-10-CM

## 2017-12-12 PROCEDURE — 36415 COLL VENOUS BLD VENIPUNCTURE: CPT

## 2017-12-12 PROCEDURE — 81206 BCR/ABL1 GENE MAJOR BP: CPT

## 2017-12-15 ENCOUNTER — PRIOR ORIGINAL RECORDS (OUTPATIENT)
Dept: OTHER | Age: 67
End: 2017-12-15

## 2017-12-18 ENCOUNTER — TELEPHONE (OUTPATIENT)
Dept: HEMATOLOGY/ONCOLOGY | Facility: HOSPITAL | Age: 67
End: 2017-12-18

## 2017-12-18 NOTE — TELEPHONE ENCOUNTER
Vlad Florez MD  P [facility] Rns             Let patient know that leukemia studies show excellent result. The number of cells detected is only 10% of what it was orginally. Unable to reach pt by phone.  Left VM requesting pt to call ba

## 2017-12-31 ENCOUNTER — SNF/IP PROF CHARGE ONLY (OUTPATIENT)
Dept: HEMATOLOGY/ONCOLOGY | Facility: HOSPITAL | Age: 67
End: 2017-12-31

## 2017-12-31 DIAGNOSIS — C92.10 CML (CHRONIC MYELOCYTIC LEUKEMIA) (HCC): ICD-10-CM

## 2017-12-31 PROCEDURE — G9678 ONCOLOGY CARE MODEL SERVICE: HCPCS | Performed by: SPECIALIST

## 2018-01-11 ENCOUNTER — TELEPHONE (OUTPATIENT)
Dept: HEMATOLOGY/ONCOLOGY | Facility: HOSPITAL | Age: 68
End: 2018-01-11

## 2018-01-31 ENCOUNTER — SNF/IP PROF CHARGE ONLY (OUTPATIENT)
Dept: HEMATOLOGY/ONCOLOGY | Facility: HOSPITAL | Age: 68
End: 2018-01-31

## 2018-01-31 DIAGNOSIS — C92.10 CML (CHRONIC MYELOCYTIC LEUKEMIA) (HCC): ICD-10-CM

## 2018-01-31 PROCEDURE — G9678 ONCOLOGY CARE MODEL SERVICE: HCPCS | Performed by: SPECIALIST

## 2018-02-21 ENCOUNTER — RT VISIT (OUTPATIENT)
Dept: RESPIRATORY THERAPY | Facility: HOSPITAL | Age: 68
End: 2018-02-21
Attending: INTERNAL MEDICINE
Payer: MEDICARE

## 2018-02-21 DIAGNOSIS — J98.6 DIAPHRAGM PARALYSIS: ICD-10-CM

## 2018-02-21 DIAGNOSIS — R06.00 DYSPNEA ON EXERTION: ICD-10-CM

## 2018-02-21 PROCEDURE — 94729 DIFFUSING CAPACITY: CPT

## 2018-02-21 PROCEDURE — 94726 PLETHYSMOGRAPHY LUNG VOLUMES: CPT

## 2018-02-21 PROCEDURE — 94010 BREATHING CAPACITY TEST: CPT

## 2018-02-21 NOTE — PROCEDURES
Spirometry and flow volume loop appear normal with no evidence of airway obstruction     There is borderline mild  restriction present     The diffusing capacity is moderately  decreased,  but corrects into the normal range when alveolar lung volumes are t

## 2018-02-22 ENCOUNTER — HOSPITAL ENCOUNTER (OUTPATIENT)
Dept: GENERAL RADIOLOGY | Age: 68
Discharge: HOME | End: 2018-02-22
Attending: INTERNAL MEDICINE
Payer: MEDICARE

## 2018-02-22 DIAGNOSIS — J98.6 DIAPHRAGMATIC PARALYSIS: ICD-10-CM

## 2018-02-22 PROCEDURE — 76000 FLUOROSCOPY <1 HR PHYS/QHP: CPT | Performed by: INTERNAL MEDICINE

## 2018-02-28 ENCOUNTER — SNF/IP PROF CHARGE ONLY (OUTPATIENT)
Dept: HEMATOLOGY/ONCOLOGY | Facility: HOSPITAL | Age: 68
End: 2018-02-28

## 2018-02-28 DIAGNOSIS — C92.10 CML (CHRONIC MYELOCYTIC LEUKEMIA) (HCC): ICD-10-CM

## 2018-02-28 PROCEDURE — G9678 ONCOLOGY CARE MODEL SERVICE: HCPCS | Performed by: SPECIALIST

## 2018-03-06 NOTE — PROGRESS NOTES
[facility] Report of Consultation      Patient Name: Myra Boucher   YOB: 1950  Medical Record Number: [id number]  Attending Physician: Tamika Ramirez M.D.      Date of Visit: 3/7/2018      Chief Complaint  Chronic myelogenous l tobacco user but quit 1997; denies alcohol use. Current Medications     DULoxetine HCl 60 MG Oral Cap DR Particles Take 60 mg by mouth daily. Disp:  Rfl:    lisinopril 5 MG Oral Tab Take 1 tablet (5 mg total) by mouth daily.  Disp: 30 tablet Rfl: 6 Physical Examination   Constitutional      Well developed, well nourished. Appears close to chronological age. No apparent distress. Head                   Normocephalic and atraumatic. Eyes                  Conjunctiva clear; sclera anicteric.   ENM Neutrophil Absolute 4.36 1.30 - 6.70 x10(3) uL   Lymphocyte Absolute 1.67 0.90 - 4.00 x10(3) uL   Monocyte Absolute 0.40 0.10 - 1.00 x10(3) uL   Eosinophil Absolute 0.33 (H) 0.00 - 0.30 x10(3) uL   Basophil Absolute 0.04 0.00 - 0.10 x10(3) uL   Immature

## 2018-03-07 ENCOUNTER — OFFICE VISIT (OUTPATIENT)
Dept: HEMATOLOGY/ONCOLOGY | Age: 68
End: 2018-03-07
Attending: NURSE PRACTITIONER
Payer: MEDICARE

## 2018-03-07 VITALS
SYSTOLIC BLOOD PRESSURE: 128 MMHG | OXYGEN SATURATION: 96 % | HEART RATE: 79 BPM | BODY MASS INDEX: 35 KG/M2 | DIASTOLIC BLOOD PRESSURE: 77 MMHG | TEMPERATURE: 97 F | WEIGHT: 253.81 LBS | RESPIRATION RATE: 20 BRPM

## 2018-03-07 DIAGNOSIS — C92.10 CML (CHRONIC MYELOCYTIC LEUKEMIA) (HCC): Primary | ICD-10-CM

## 2018-03-07 LAB
ALBUMIN SERPL-MCNC: 3.6 G/DL (ref 3.5–4.8)
ALP LIVER SERPL-CCNC: 76 U/L (ref 45–117)
ALT SERPL-CCNC: 27 U/L (ref 17–63)
AST SERPL-CCNC: 20 U/L (ref 15–41)
BASOPHILS # BLD AUTO: 0.04 X10(3) UL (ref 0–0.1)
BASOPHILS NFR BLD AUTO: 0.6 %
BILIRUB SERPL-MCNC: 1 MG/DL (ref 0.1–2)
BUN BLD-MCNC: 20 MG/DL (ref 8–20)
CALCIUM BLD-MCNC: 8.7 MG/DL (ref 8.3–10.3)
CHLORIDE: 105 MMOL/L (ref 101–111)
CO2: 30 MMOL/L (ref 22–32)
CREAT BLD-MCNC: 1.13 MG/DL (ref 0.7–1.3)
EOSINOPHIL # BLD AUTO: 0.33 X10(3) UL (ref 0–0.3)
EOSINOPHIL NFR BLD AUTO: 4.8 %
ERYTHROCYTE [DISTWIDTH] IN BLOOD BY AUTOMATED COUNT: 17.7 % (ref 11.5–16)
GLUCOSE BLD-MCNC: 139 MG/DL (ref 70–99)
HCT VFR BLD AUTO: 37.4 % (ref 37–53)
HGB BLD-MCNC: 11.8 G/DL (ref 13–17)
IMMATURE GRANULOCYTE COUNT: 0.04 X10(3) UL (ref 0–1)
IMMATURE GRANULOCYTE RATIO %: 0.6 %
LYMPHOCYTES # BLD AUTO: 1.67 X10(3) UL (ref 0.9–4)
LYMPHOCYTES NFR BLD AUTO: 24.4 %
M PROTEIN MFR SERPL ELPH: 7.4 G/DL (ref 6.1–8.3)
MCH RBC QN AUTO: 26.6 PG (ref 27–33.2)
MCHC RBC AUTO-ENTMCNC: 31.6 G/DL (ref 31–37)
MCV RBC AUTO: 84.4 FL (ref 80–99)
MONOCYTES # BLD AUTO: 0.4 X10(3) UL (ref 0.1–1)
MONOCYTES NFR BLD AUTO: 5.8 %
NEUTROPHIL ABS PRELIM: 4.36 X10 (3) UL (ref 1.3–6.7)
NEUTROPHILS # BLD AUTO: 4.36 X10(3) UL (ref 1.3–6.7)
NEUTROPHILS NFR BLD AUTO: 63.8 %
PLATELET # BLD AUTO: 239 10(3)UL (ref 150–450)
POTASSIUM SERPL-SCNC: 4.1 MMOL/L (ref 3.6–5.1)
RBC # BLD AUTO: 4.43 X10(6)UL (ref 3.8–5.8)
RED CELL DISTRIBUTION WIDTH-SD: 54 FL (ref 35.1–46.3)
SODIUM SERPL-SCNC: 140 MMOL/L (ref 136–144)
WBC # BLD AUTO: 6.8 X10(3) UL (ref 4–13)

## 2018-03-07 PROCEDURE — 99213 OFFICE O/P EST LOW 20 MIN: CPT | Performed by: SPECIALIST

## 2018-03-07 RX ORDER — DULOXETIN HYDROCHLORIDE 60 MG/1
60 CAPSULE, DELAYED RELEASE ORAL DAILY
COMMUNITY

## 2018-03-07 NOTE — PROGRESS NOTES
Patient is here today for follow up with Mary Almaguer for Chronic Myelocytic Leukemia. Patient stated burning pain in his feet rated a 7 on a scale of 0-10. Patient is currently on Nilotinib therapy.  Medication list and medical history were reviewed and [facility]

## 2018-03-12 LAB
BCR-ABL1, MAJOR (P210) RESULT: DETECTED
BCRABL1 INTERNATIONAL SCALE(%): 0.97 %
BCRABL1/ABL1 MAJOR(P210) RATIO: 0.01

## 2018-03-13 ENCOUNTER — TELEPHONE (OUTPATIENT)
Dept: HEMATOLOGY/ONCOLOGY | Facility: HOSPITAL | Age: 68
End: 2018-03-13

## 2018-03-13 NOTE — TELEPHONE ENCOUNTER
MD Huong Chaparro, RN             Let him know that his CML number has fallen further. Excellent response.

## 2018-04-20 ENCOUNTER — PRIOR ORIGINAL RECORDS (OUTPATIENT)
Dept: OTHER | Age: 68
End: 2018-04-20

## 2018-06-06 ENCOUNTER — TELEPHONE (OUTPATIENT)
Dept: HEMATOLOGY/ONCOLOGY | Facility: HOSPITAL | Age: 68
End: 2018-06-06

## 2018-06-06 ENCOUNTER — OFFICE VISIT (OUTPATIENT)
Dept: HEMATOLOGY/ONCOLOGY | Age: 68
End: 2018-06-06
Attending: NURSE PRACTITIONER
Payer: MEDICARE

## 2018-06-06 VITALS
WEIGHT: 253.63 LBS | BODY MASS INDEX: 35 KG/M2 | HEART RATE: 70 BPM | DIASTOLIC BLOOD PRESSURE: 66 MMHG | RESPIRATION RATE: 20 BRPM | SYSTOLIC BLOOD PRESSURE: 153 MMHG | OXYGEN SATURATION: 97 %

## 2018-06-06 DIAGNOSIS — C92.10 CML (CHRONIC MYELOCYTIC LEUKEMIA) (HCC): Primary | ICD-10-CM

## 2018-06-06 PROCEDURE — 99213 OFFICE O/P EST LOW 20 MIN: CPT | Performed by: SPECIALIST

## 2018-06-06 NOTE — TELEPHONE ENCOUNTER
MD Benigno Christy RN             Can you call him with his sugar today. He wanted to know. Mobile phone will work.

## 2018-06-10 RX ORDER — GABAPENTIN 300 MG/1
300 CAPSULE ORAL 3 TIMES DAILY
COMMUNITY
End: 2019-01-22 | Stop reason: ALTCHOICE

## 2018-06-10 NOTE — PROGRESS NOTES
[facility] Report of Consultation      Patient Name: Manuel Burden   YOB: 1950  Medical Record Number: [id number]  Attending Physician: Stacey Masterson M.D.      Date of Visit:6/6/2018      Chief Complaint  Chronic myelogenous le quit 1997; denies alcohol use. Current Medications     gabapentin 300 MG Oral Cap Take 300 mg by mouth 3 (three) times daily. Disp:  Rfl:    DULoxetine HCl 60 MG Oral Cap DR Particles Take 60 mg by mouth daily.  Disp:  Rfl:    lisinopril 5 MG Oral Tab BMI 35.37 kg/m²     Physical Examination   Constitutional      Well developed, well nourished. Appears close to chronological age. No apparent distress. Head                   Normocephalic and atraumatic.   Eyes                  Conjunctiva clear; scler - 6.70 x10 (3) uL   Neutrophil Absolute 4.38 1.30 - 6.70 x10(3) uL   Lymphocyte Absolute 1.86 0.90 - 4.00 x10(3) uL   Monocyte Absolute 0.44 0.10 - 1.00 x10(3) uL   Eosinophil Absolute 0.29 0.00 - 0.30 x10(3) uL   Basophil Absolute 0.05 0.00 - 0.10 x10(3)

## 2018-06-12 ENCOUNTER — TELEPHONE (OUTPATIENT)
Dept: HEMATOLOGY/ONCOLOGY | Facility: HOSPITAL | Age: 68
End: 2018-06-12

## 2018-06-12 NOTE — TELEPHONE ENCOUNTER
Harvey Duane, MD Lizette Members, RN             Please call him. Tell him that his CML number is actually higher. This is an unexpected finding. Confirm that he has been faithfully taking the medicine without missing days.  Also, he was started on

## 2018-06-18 NOTE — TELEPHONE ENCOUNTER
MD Jessy Bruce RN                No interaction. Keep taking nilotinib and repeat labs in 1 month.       Patient aware

## 2018-06-20 ENCOUNTER — TELEPHONE (OUTPATIENT)
Dept: HEMATOLOGY/ONCOLOGY | Facility: HOSPITAL | Age: 68
End: 2018-06-20

## 2018-06-20 NOTE — TELEPHONE ENCOUNTER
Pt called with c/o bilateral arm/hand throbbing, \"feels like needles all over\". Pt has crystal upper am pain, left more than right, notes having \"marble-sized\" lumps on the back of his arms for 1 week now. Pt continues on Nilotinib.  DW Dr. Hugo Medrano, doesn't

## 2018-07-16 ENCOUNTER — TELEPHONE (OUTPATIENT)
Dept: HEMATOLOGY/ONCOLOGY | Facility: HOSPITAL | Age: 68
End: 2018-07-16

## 2018-07-16 DIAGNOSIS — C92.10 CML (CHRONIC MYELOCYTIC LEUKEMIA) (HCC): Primary | ICD-10-CM

## 2018-07-17 ENCOUNTER — LAB ENCOUNTER (OUTPATIENT)
Dept: LAB | Age: 68
End: 2018-07-17
Attending: FAMILY MEDICINE
Payer: MEDICARE

## 2018-07-17 DIAGNOSIS — C92.10 CML (CHRONIC MYELOCYTIC LEUKEMIA) (HCC): ICD-10-CM

## 2018-07-17 LAB
ALBUMIN SERPL-MCNC: 3.5 G/DL (ref 3.5–4.8)
ALP LIVER SERPL-CCNC: 73 U/L (ref 45–117)
ALT SERPL-CCNC: 26 U/L (ref 17–63)
AST SERPL-CCNC: 21 U/L (ref 15–41)
BASOPHILS # BLD AUTO: 0.04 X10(3) UL (ref 0–0.1)
BASOPHILS NFR BLD AUTO: 0.6 %
BILIRUB SERPL-MCNC: 1.1 MG/DL (ref 0.1–2)
BUN BLD-MCNC: 23 MG/DL (ref 8–20)
CALCIUM BLD-MCNC: 9 MG/DL (ref 8.3–10.3)
CHLORIDE: 109 MMOL/L (ref 101–111)
CO2: 25 MMOL/L (ref 22–32)
CREAT BLD-MCNC: 1.04 MG/DL (ref 0.7–1.3)
EOSINOPHIL # BLD AUTO: 0.22 X10(3) UL (ref 0–0.3)
EOSINOPHIL NFR BLD AUTO: 3.2 %
ERYTHROCYTE [DISTWIDTH] IN BLOOD BY AUTOMATED COUNT: 16.4 % (ref 11.5–16)
GLUCOSE BLD-MCNC: 142 MG/DL (ref 70–99)
HCT VFR BLD AUTO: 37.7 % (ref 37–53)
HGB BLD-MCNC: 11.2 G/DL (ref 13–17)
IMMATURE GRANULOCYTE COUNT: 0.04 X10(3) UL (ref 0–1)
IMMATURE GRANULOCYTE RATIO %: 0.6 %
LYMPHOCYTES # BLD AUTO: 1.67 X10(3) UL (ref 0.9–4)
LYMPHOCYTES NFR BLD AUTO: 24.2 %
M PROTEIN MFR SERPL ELPH: 7.3 G/DL (ref 6.1–8.3)
MCH RBC QN AUTO: 26.4 PG (ref 27–33.2)
MCHC RBC AUTO-ENTMCNC: 29.7 G/DL (ref 31–37)
MCV RBC AUTO: 88.7 FL (ref 80–99)
MONOCYTES # BLD AUTO: 0.42 X10(3) UL (ref 0.1–1)
MONOCYTES NFR BLD AUTO: 6.1 %
NEUTROPHIL ABS PRELIM: 4.51 X10 (3) UL (ref 1.3–6.7)
NEUTROPHILS # BLD AUTO: 4.51 X10(3) UL (ref 1.3–6.7)
NEUTROPHILS NFR BLD AUTO: 65.3 %
PLATELET # BLD AUTO: 225 10(3)UL (ref 150–450)
POTASSIUM SERPL-SCNC: 4.5 MMOL/L (ref 3.6–5.1)
RBC # BLD AUTO: 4.25 X10(6)UL (ref 3.8–5.8)
RED CELL DISTRIBUTION WIDTH-SD: 53.5 FL (ref 35.1–46.3)
SODIUM SERPL-SCNC: 141 MMOL/L (ref 136–144)
WBC # BLD AUTO: 6.9 X10(3) UL (ref 4–13)

## 2018-07-17 PROCEDURE — 81206 BCR/ABL1 GENE MAJOR BP: CPT

## 2018-07-17 PROCEDURE — 36415 COLL VENOUS BLD VENIPUNCTURE: CPT

## 2018-07-17 PROCEDURE — 80053 COMPREHEN METABOLIC PANEL: CPT

## 2018-07-17 PROCEDURE — 85025 COMPLETE CBC W/AUTO DIFF WBC: CPT

## 2018-07-22 LAB
BCR-ABL1, MAJOR (P210) RESULT: DETECTED
BCRABL1 INTERNATIONAL SCALE(%): 0.47 %
BCRABL1/ABL1 MAJOR(P210) RATIO: 0.01

## 2018-07-23 ENCOUNTER — TELEPHONE (OUTPATIENT)
Dept: HEMATOLOGY/ONCOLOGY | Facility: HOSPITAL | Age: 68
End: 2018-07-23

## 2018-07-23 DIAGNOSIS — C92.10 CML (CHRONIC MYELOCYTIC LEUKEMIA) (HCC): ICD-10-CM

## 2018-07-23 NOTE — TELEPHONE ENCOUNTER
MD Remi Bazzi, RN             Please call patient. Let him know that his CML number is back down. Don't know why it was higher last time but number is good now. Don't need to check again until he comes back to see me in October.

## 2018-07-27 ENCOUNTER — TELEPHONE (OUTPATIENT)
Dept: HEMATOLOGY/ONCOLOGY | Facility: HOSPITAL | Age: 68
End: 2018-07-27

## 2018-07-31 ENCOUNTER — SNF/IP PROF CHARGE ONLY (OUTPATIENT)
Dept: HEMATOLOGY/ONCOLOGY | Facility: HOSPITAL | Age: 68
End: 2018-07-31

## 2018-07-31 DIAGNOSIS — C92.10 CML (CHRONIC MYELOCYTIC LEUKEMIA) (HCC): ICD-10-CM

## 2018-07-31 PROCEDURE — G9678 ONCOLOGY CARE MODEL SERVICE: HCPCS | Performed by: SPECIALIST

## 2018-08-02 ENCOUNTER — SOCIAL WORK SERVICES (OUTPATIENT)
Dept: HEMATOLOGY/ONCOLOGY | Facility: HOSPITAL | Age: 68
End: 2018-08-02

## 2018-08-02 NOTE — PROGRESS NOTES
HEENA completed patients patient assistance program application for Tasigna for [facility]. The application with income and tax information has been faxed to [phone].

## 2018-08-31 ENCOUNTER — SNF/IP PROF CHARGE ONLY (OUTPATIENT)
Dept: HEMATOLOGY/ONCOLOGY | Facility: HOSPITAL | Age: 68
End: 2018-08-31

## 2018-08-31 DIAGNOSIS — C92.10 CML (CHRONIC MYELOCYTIC LEUKEMIA) (HCC): ICD-10-CM

## 2018-08-31 PROCEDURE — G9678 ONCOLOGY CARE MODEL SERVICE: HCPCS | Performed by: SPECIALIST

## 2018-09-30 ENCOUNTER — SNF/IP PROF CHARGE ONLY (OUTPATIENT)
Dept: HEMATOLOGY/ONCOLOGY | Facility: HOSPITAL | Age: 68
End: 2018-09-30

## 2018-09-30 DIAGNOSIS — C92.10 CML (CHRONIC MYELOCYTIC LEUKEMIA) (HCC): ICD-10-CM

## 2018-09-30 PROCEDURE — G9678 ONCOLOGY CARE MODEL SERVICE: HCPCS | Performed by: SPECIALIST

## 2018-10-10 ENCOUNTER — OFFICE VISIT (OUTPATIENT)
Dept: HEMATOLOGY/ONCOLOGY | Age: 68
End: 2018-10-10
Attending: NURSE PRACTITIONER
Payer: MEDICARE

## 2018-10-10 VITALS
TEMPERATURE: 97 F | OXYGEN SATURATION: 95 % | WEIGHT: 252.19 LBS | RESPIRATION RATE: 20 BRPM | BODY MASS INDEX: 35 KG/M2 | HEART RATE: 67 BPM | SYSTOLIC BLOOD PRESSURE: 141 MMHG | DIASTOLIC BLOOD PRESSURE: 69 MMHG

## 2018-10-10 DIAGNOSIS — C92.10 CML (CHRONIC MYELOCYTIC LEUKEMIA) (HCC): Primary | ICD-10-CM

## 2018-10-10 PROCEDURE — 99213 OFFICE O/P EST LOW 20 MIN: CPT | Performed by: SPECIALIST

## 2018-10-10 NOTE — PROGRESS NOTES
Patient is here today for follow up with Verito Caldera for Chronic Myelocytic leukemia. Patient is on Nilotinib 300mg BID. Patient denies pain. Medication list and medical history were reviewed and updated.      Education Record    Learner:  Patient    Disease

## 2018-10-13 NOTE — PROGRESS NOTES
[facility] Report of Consultation      Patient Name: Isaac Howard   YOB: 1950  Medical Record Number: [id number]  Attending Physician: Madison Messina M.D.      Date of Visit: 10/10/2018      Chief Complaint  Chronic myelogenous 300 mg by mouth 2 (two) times daily. Disp: 120 capsule Rfl: 11   gabapentin 300 MG Oral Cap Take 300 mg by mouth 3 (three) times daily. Disp:  Rfl:    DULoxetine HCl 60 MG Oral Cap DR Particles Take 60 mg by mouth daily.  Disp:  Rfl:    lisinopril 5 MG Oral Conjunctiva clear; sclera anicteric. ENMT                 External nose normal; external ears normal.  Neck                   Supple. Hematologic/Lymphatic No cervical, supraclavicular, axillary lymphadenopathy.   Respiratory          Normal effo Neutrophil Absolute 3.69 1.30 - 6.70 x10(3) uL    Lymphocyte Absolute 1.51 0.90 - 4.00 x10(3) uL    Monocyte Absolute 0.34 0.10 - 1.00 x10(3) uL    Eosinophil Absolute 0.19 0.00 - 0.30 x10(3) uL    Basophil Absolute 0.03 0.00 - 0.10 x10(3) uL    Immature G

## 2018-10-17 ENCOUNTER — TELEPHONE (OUTPATIENT)
Dept: HEMATOLOGY/ONCOLOGY | Facility: HOSPITAL | Age: 68
End: 2018-10-17

## 2018-10-17 NOTE — TELEPHONE ENCOUNTER
Message   Received: Yesterday   Message Contents   MD Paulette Vera, RN             Let him know his leukemia number has fallen by almost 50%. Good results. We'll see him in 3 months.

## 2018-10-19 ENCOUNTER — PRIOR ORIGINAL RECORDS (OUTPATIENT)
Dept: OTHER | Age: 68
End: 2018-10-19

## 2018-10-19 ENCOUNTER — MYAURORA ACCOUNT LINK (OUTPATIENT)
Dept: OTHER | Age: 68
End: 2018-10-19

## 2018-10-22 LAB
AMB EXT CHOLESTEROL, TOTAL: 144 MG/DL
AMB EXT CREATININE: 1 MG/DL
AMB EXT GFR: 79
AMB EXT GLUCOSE: 149 MG/DL
AMB EXT HDL CHOLESTEROL: 41 MG/DL
AMB EXT HGBA1C: 6.2 %
AMB EXT LDL CHOLESTEROL, DIRECT: 73 MG/DL
AMB EXT TRIGLYCERIDES: 152 MG/DL
AMB EXT TSH: 1.2 MIU/ML

## 2018-10-31 ENCOUNTER — SNF/IP PROF CHARGE ONLY (OUTPATIENT)
Dept: HEMATOLOGY/ONCOLOGY | Facility: HOSPITAL | Age: 68
End: 2018-10-31

## 2018-10-31 DIAGNOSIS — C92.10 CML (CHRONIC MYELOCYTIC LEUKEMIA) (HCC): ICD-10-CM

## 2018-10-31 PROCEDURE — G9678 ONCOLOGY CARE MODEL SERVICE: HCPCS | Performed by: SPECIALIST

## 2018-11-30 ENCOUNTER — SNF/IP PROF CHARGE ONLY (OUTPATIENT)
Dept: HEMATOLOGY/ONCOLOGY | Facility: HOSPITAL | Age: 68
End: 2018-11-30

## 2018-11-30 DIAGNOSIS — C92.10 CML (CHRONIC MYELOCYTIC LEUKEMIA) (HCC): ICD-10-CM

## 2018-11-30 PROCEDURE — G9678 ONCOLOGY CARE MODEL SERVICE: HCPCS | Performed by: SPECIALIST

## 2018-12-19 ENCOUNTER — HOSPITAL ENCOUNTER (OUTPATIENT)
Dept: GENERAL RADIOLOGY | Age: 68
Discharge: HOME | End: 2018-12-19
Attending: INTERNAL MEDICINE
Payer: MEDICARE

## 2018-12-19 DIAGNOSIS — I50.9 CONGESTIVE HEART FAILURE (CHF) (HCC): ICD-10-CM

## 2018-12-19 DIAGNOSIS — J98.6 DIAPHRAGM PARALYSIS: ICD-10-CM

## 2018-12-19 PROCEDURE — 71046 X-RAY EXAM CHEST 2 VIEWS: CPT | Performed by: INTERNAL MEDICINE

## 2018-12-31 ENCOUNTER — SNF/IP PROF CHARGE ONLY (OUTPATIENT)
Dept: HEMATOLOGY/ONCOLOGY | Facility: HOSPITAL | Age: 68
End: 2018-12-31

## 2018-12-31 DIAGNOSIS — C92.10 CML (CHRONIC MYELOCYTIC LEUKEMIA) (HCC): ICD-10-CM

## 2018-12-31 PROCEDURE — G9678 ONCOLOGY CARE MODEL SERVICE: HCPCS | Performed by: SPECIALIST

## 2019-01-01 NOTE — PROGRESS NOTES
[facility] Progress Note      Patient Name: Sharon Caruso   YOB: 1950  Medical Record Number: [id number]  Attending Physician: Rika Chase M.D.      Date of Visit: 1/9/2019      Chief Complaint  Chronic myelogenous leukemia - mouth 2 (two) times daily. Disp: 120 capsule Rfl: 11   gabapentin 300 MG Oral Cap Take 300 mg by mouth 3 (three) times daily. Disp:  Rfl:    DULoxetine HCl 60 MG Oral Cap DR Particles Take 60 mg by mouth daily.  Disp:  Rfl:    lisinopril 5 MG Oral Tab Take Well developed, well nourished. Appears close to chronological age. No apparent distress. Head                   Normocephalic and atraumatic. Eyes                  Conjunctiva clear; sclera anicteric.   ENMT                 External nose normal; ext 33.2 pg    MCHC 30.8 (L) 31.0 - 37.0 g/dL    RDW 15.9 11.5 - 16.0 %    RDW-SD 49.3 (H) 35.1 - 46.3 fL    Neutrophil Absolute Prelim 4.23 1.30 - 6.70 x10 (3) uL    Neutrophil Absolute 4.23 1.30 - 6.70 x10(3) uL    Lymphocyte Absolute 1.82 0.90 - 4.00 x10(3)

## 2019-01-09 ENCOUNTER — OFFICE VISIT (OUTPATIENT)
Dept: HEMATOLOGY/ONCOLOGY | Age: 69
End: 2019-01-09
Attending: NURSE PRACTITIONER
Payer: MEDICARE

## 2019-01-09 VITALS
WEIGHT: 251.63 LBS | HEART RATE: 74 BPM | OXYGEN SATURATION: 97 % | TEMPERATURE: 97 F | SYSTOLIC BLOOD PRESSURE: 134 MMHG | DIASTOLIC BLOOD PRESSURE: 76 MMHG | RESPIRATION RATE: 20 BRPM | BODY MASS INDEX: 35 KG/M2

## 2019-01-09 DIAGNOSIS — C92.10 CML (CHRONIC MYELOCYTIC LEUKEMIA) (HCC): Primary | ICD-10-CM

## 2019-01-09 LAB
ALBUMIN SERPL-MCNC: 3.6 G/DL (ref 3.1–4.5)
ALBUMIN/GLOB SERPL: 1 {RATIO} (ref 1–2)
ALP LIVER SERPL-CCNC: 79 U/L (ref 45–117)
ALT SERPL-CCNC: 31 U/L (ref 17–63)
ANION GAP SERPL CALC-SCNC: 5 MMOL/L (ref 0–18)
AST SERPL-CCNC: 17 U/L (ref 15–41)
BASOPHILS # BLD AUTO: 0.05 X10(3) UL (ref 0–0.1)
BASOPHILS NFR BLD AUTO: 0.7 %
BILIRUB SERPL-MCNC: 0.8 MG/DL (ref 0.1–2)
BUN BLD-MCNC: 20 MG/DL (ref 8–20)
BUN/CREAT SERPL: 22.5 (ref 10–20)
CALCIUM BLD-MCNC: 8.9 MG/DL (ref 8.3–10.3)
CHLORIDE SERPL-SCNC: 108 MMOL/L (ref 101–111)
CO2 SERPL-SCNC: 28 MMOL/L (ref 22–32)
CREAT BLD-MCNC: 0.89 MG/DL (ref 0.7–1.3)
EOSINOPHIL # BLD AUTO: 0.39 X10(3) UL (ref 0–0.3)
EOSINOPHIL NFR BLD AUTO: 5.6 %
ERYTHROCYTE [DISTWIDTH] IN BLOOD BY AUTOMATED COUNT: 15.9 % (ref 11.5–16)
GLOBULIN PLAS-MCNC: 3.5 G/DL (ref 2.8–4.4)
GLUCOSE BLD-MCNC: 126 MG/DL (ref 70–99)
HCT VFR BLD AUTO: 38.9 % (ref 37–53)
HGB BLD-MCNC: 12 G/DL (ref 13–17)
IMMATURE GRANULOCYTE COUNT: 0.04 X10(3) UL (ref 0–1)
IMMATURE GRANULOCYTE RATIO %: 0.6 %
LYMPHOCYTES # BLD AUTO: 1.82 X10(3) UL (ref 0.9–4)
LYMPHOCYTES NFR BLD AUTO: 26.1 %
M PROTEIN MFR SERPL ELPH: 7.1 G/DL (ref 6.4–8.2)
MCH RBC QN AUTO: 26.3 PG (ref 27–33.2)
MCHC RBC AUTO-ENTMCNC: 30.8 G/DL (ref 31–37)
MCV RBC AUTO: 85.1 FL (ref 80–99)
MONOCYTES # BLD AUTO: 0.45 X10(3) UL (ref 0.1–1)
MONOCYTES NFR BLD AUTO: 6.4 %
NEUTROPHIL ABS PRELIM: 4.23 X10 (3) UL (ref 1.3–6.7)
NEUTROPHILS # BLD AUTO: 4.23 X10(3) UL (ref 1.3–6.7)
NEUTROPHILS NFR BLD AUTO: 60.6 %
OSMOLALITY SERPL CALC.SUM OF ELEC: 296 MOSM/KG (ref 275–295)
PLATELET # BLD AUTO: 225 10(3)UL (ref 150–450)
POTASSIUM SERPL-SCNC: 3.9 MMOL/L (ref 3.6–5.1)
RBC # BLD AUTO: 4.57 X10(6)UL (ref 3.8–5.8)
RED CELL DISTRIBUTION WIDTH-SD: 49.3 FL (ref 35.1–46.3)
SODIUM SERPL-SCNC: 141 MMOL/L (ref 136–144)
WBC # BLD AUTO: 7 X10(3) UL (ref 4–13)

## 2019-01-09 PROCEDURE — 99213 OFFICE O/P EST LOW 20 MIN: CPT | Performed by: SPECIALIST

## 2019-01-09 NOTE — PROGRESS NOTES
Patient is here today for follow up with Chito Falcon for CML. Is on Nilotinib 300mg BID. Patient stated sore scalp for the last week. Fatigued. Rash on chest and arms. Intermittent nausea controlled with antiemetics.  Medication list,medical history and toxi

## 2019-01-10 ENCOUNTER — PRIOR ORIGINAL RECORDS (OUTPATIENT)
Dept: OTHER | Age: 69
End: 2019-01-10

## 2019-01-16 LAB
BCR-ABL1, MAJOR (P210) RESULT: DETECTED
BCRABL1 INTERNATIONAL SCALE(%): 0.31 %
BCRABL1/ABL1 MAJOR(P210) RATIO: 0.01

## 2019-01-18 ENCOUNTER — TELEPHONE (OUTPATIENT)
Dept: HEMATOLOGY/ONCOLOGY | Facility: HOSPITAL | Age: 69
End: 2019-01-18

## 2019-01-18 NOTE — TELEPHONE ENCOUNTER
MD Kunal Thapa, RN             Let him know that his BCR-ABL (CML number) is 0.3 which is stable from last time. Would have preferred that it was lower but stable is OK. Just make sure that he's not missing any doses.  I'll see him

## 2019-01-22 ENCOUNTER — OFFICE VISIT (OUTPATIENT)
Dept: FAMILY MEDICINE CLINIC | Facility: CLINIC | Age: 69
End: 2019-01-22
Payer: MEDICARE

## 2019-01-22 ENCOUNTER — TELEPHONE (OUTPATIENT)
Dept: FAMILY MEDICINE CLINIC | Facility: CLINIC | Age: 69
End: 2019-01-22

## 2019-01-22 VITALS
OXYGEN SATURATION: 98 % | HEART RATE: 76 BPM | BODY MASS INDEX: 35.21 KG/M2 | WEIGHT: 251.5 LBS | DIASTOLIC BLOOD PRESSURE: 88 MMHG | HEIGHT: 71 IN | TEMPERATURE: 98 F | SYSTOLIC BLOOD PRESSURE: 130 MMHG

## 2019-01-22 DIAGNOSIS — E11.9 TYPE 2 DIABETES MELLITUS WITHOUT COMPLICATION, WITHOUT LONG-TERM CURRENT USE OF INSULIN (HCC): ICD-10-CM

## 2019-01-22 DIAGNOSIS — I42.9 CARDIOMYOPATHY, UNSPECIFIED TYPE (HCC): ICD-10-CM

## 2019-01-22 DIAGNOSIS — C92.10 CML (CHRONIC MYELOCYTIC LEUKEMIA) (HCC): Primary | ICD-10-CM

## 2019-01-22 DIAGNOSIS — N18.30 CKD (CHRONIC KIDNEY DISEASE) STAGE 3, GFR 30-59 ML/MIN (HCC): ICD-10-CM

## 2019-01-22 PROCEDURE — 99204 OFFICE O/P NEW MOD 45 MIN: CPT | Performed by: FAMILY MEDICINE

## 2019-01-22 RX ORDER — CARVEDILOL 3.12 MG/1
3.12 TABLET ORAL 2 TIMES DAILY WITH MEALS
COMMUNITY

## 2019-01-22 NOTE — PROGRESS NOTES
Na Fountain is a 77year old male. Patient presents with: Other: new pt-evaluate spot on stomach-has had it for approx 1 year, it is itchy,painful, burning-also other things to discuss. ..room 1        HPI:   Patient has been to Dr. Kranthi Macias for r visit.       Past Medical History:   Diagnosis Date   • Anxiety state, unspecified    • Depression    • Diabetes ([facility] [address]    • Esophageal reflux    • Other and unspecified hyperlipidemia    • Unspecified essential hypertension      Past Surgical History:   Pro encounter diagnosis)  Type 2 diabetes mellitus without complication, without long-term current use of insulin (hcc)  Ckd (chronic kidney disease) stage 3, gfr 30-59 ml/min (hcc)  Cardiomyopathy, unspecified type (hcc)    Explained that the problem is the m

## 2019-02-04 ENCOUNTER — TELEPHONE (OUTPATIENT)
Dept: FAMILY MEDICINE CLINIC | Facility: CLINIC | Age: 69
End: 2019-02-04

## 2019-02-15 ENCOUNTER — TELEPHONE (OUTPATIENT)
Dept: FAMILY MEDICINE CLINIC | Facility: CLINIC | Age: 69
End: 2019-02-15

## 2019-02-15 NOTE — TELEPHONE ENCOUNTER
PT TALKED WITH JOY STRICKLAND, THEY NEVER RECIEVED Miconazole Nitrate (ZEASORB-AF) 2 % External Powder, PLEASE RESEND & THEN CALL PT WHEN IT HAS BEEN DONE SO HE CAN CALL MCKENNA STRICKLAND

## 2019-02-28 VITALS
SYSTOLIC BLOOD PRESSURE: 120 MMHG | BODY MASS INDEX: 35.67 KG/M2 | DIASTOLIC BLOOD PRESSURE: 74 MMHG | WEIGHT: 254.8 LBS | OXYGEN SATURATION: 99 % | HEART RATE: 76 BPM | HEIGHT: 71 IN

## 2019-02-28 VITALS
SYSTOLIC BLOOD PRESSURE: 128 MMHG | DIASTOLIC BLOOD PRESSURE: 76 MMHG | WEIGHT: 258 LBS | HEIGHT: 71 IN | HEART RATE: 80 BPM | BODY MASS INDEX: 36.12 KG/M2

## 2019-02-28 VITALS
HEART RATE: 83 BPM | OXYGEN SATURATION: 97 % | WEIGHT: 285.2 LBS | SYSTOLIC BLOOD PRESSURE: 122 MMHG | DIASTOLIC BLOOD PRESSURE: 72 MMHG | BODY MASS INDEX: 39.93 KG/M2 | HEIGHT: 71 IN

## 2019-02-28 VITALS
OXYGEN SATURATION: 97 % | DIASTOLIC BLOOD PRESSURE: 82 MMHG | WEIGHT: 255.4 LBS | HEART RATE: 70 BPM | BODY MASS INDEX: 35.76 KG/M2 | SYSTOLIC BLOOD PRESSURE: 130 MMHG | HEIGHT: 71 IN

## 2019-02-28 VITALS
HEART RATE: 92 BPM | BODY MASS INDEX: 39.34 KG/M2 | HEIGHT: 71 IN | DIASTOLIC BLOOD PRESSURE: 58 MMHG | WEIGHT: 281 LBS | SYSTOLIC BLOOD PRESSURE: 136 MMHG

## 2019-02-28 VITALS
DIASTOLIC BLOOD PRESSURE: 70 MMHG | WEIGHT: 264 LBS | HEIGHT: 70 IN | BODY MASS INDEX: 37.8 KG/M2 | HEART RATE: 76 BPM | SYSTOLIC BLOOD PRESSURE: 140 MMHG

## 2019-03-01 VITALS
SYSTOLIC BLOOD PRESSURE: 122 MMHG | HEART RATE: 90 BPM | WEIGHT: 288 LBS | HEIGHT: 71 IN | DIASTOLIC BLOOD PRESSURE: 76 MMHG | BODY MASS INDEX: 40.32 KG/M2

## 2019-03-06 RX ORDER — LISINOPRIL 5 MG/1
5 TABLET ORAL DAILY
COMMUNITY
Start: 2018-03-06 | End: 2019-03-06 | Stop reason: SDUPTHER

## 2019-03-06 RX ORDER — LISINOPRIL 5 MG/1
5 TABLET ORAL DAILY
Qty: 30 TABLET | Refills: 1 | Status: SHIPPED | OUTPATIENT
Start: 2019-03-06 | End: 2019-05-10 | Stop reason: SDUPTHER

## 2019-03-07 ENCOUNTER — TELEPHONE (OUTPATIENT)
Dept: CARDIOLOGY | Age: 69
End: 2019-03-07

## 2019-03-27 ENCOUNTER — OFFICE VISIT (OUTPATIENT)
Dept: FAMILY MEDICINE CLINIC | Facility: CLINIC | Age: 69
End: 2019-03-27
Payer: MEDICARE

## 2019-03-27 VITALS
BODY MASS INDEX: 36 KG/M2 | HEART RATE: 76 BPM | DIASTOLIC BLOOD PRESSURE: 80 MMHG | OXYGEN SATURATION: 98 % | SYSTOLIC BLOOD PRESSURE: 144 MMHG | WEIGHT: 257 LBS | TEMPERATURE: 98 F

## 2019-03-27 DIAGNOSIS — C92.10 CML (CHRONIC MYELOCYTIC LEUKEMIA) (HCC): Primary | ICD-10-CM

## 2019-03-27 DIAGNOSIS — B35.4 TINEA CORPORIS: ICD-10-CM

## 2019-03-27 DIAGNOSIS — E11.9 TYPE 2 DIABETES MELLITUS WITHOUT COMPLICATION, WITHOUT LONG-TERM CURRENT USE OF INSULIN (HCC): ICD-10-CM

## 2019-03-27 DIAGNOSIS — N18.30 CKD (CHRONIC KIDNEY DISEASE) STAGE 3, GFR 30-59 ML/MIN (HCC): ICD-10-CM

## 2019-03-27 PROCEDURE — 99214 OFFICE O/P EST MOD 30 MIN: CPT | Performed by: FAMILY MEDICINE

## 2019-03-27 NOTE — PROGRESS NOTES
Henry Troy is a 77year old male. Patient presents with:   Other: RASH, HA, URINARY FREQ, RINGING IN EARS, SINUS DRAINAGE, HOT/SWEATS AFTER EATING AND 30 MIN LATER BM---- RM 2      HPI:   Patient has approximately 8 complaints on his list.  He com Sodium (PRAVACHOL) 40 MG Oral Tab Take 40 mg by mouth nightly. Disp:  Rfl:      No current facility-administered medications on file prior to visit.       Past Medical History:   Diagnosis Date   • Anxiety state, unspecified    • Depression    • Diabetes (H (hcc)  Ckd (chronic kidney disease) stage 3, gfr 30-59 ml/min ([facility])  Tinea corporis      Use the Zeasorb to the skin fold areas. Triamcinolone cream to the eczema in front of his ear.   I think the sweats could be related to his CML but I asked him to talk

## 2019-03-28 ENCOUNTER — TELEPHONE (OUTPATIENT)
Dept: FAMILY MEDICINE CLINIC | Facility: CLINIC | Age: 69
End: 2019-03-28

## 2019-03-28 NOTE — TELEPHONE ENCOUNTER
FYI-  PATIENT DOES NOT WANT TO DO A CPX SINCE MEDICARE WILL NOT COVER. HE CANCELLED APPT FOR  4/29 AND WILL OBTAIN RECORDS FROM DR DUNLAP [facility].

## 2019-04-10 ENCOUNTER — OFFICE VISIT (OUTPATIENT)
Dept: HEMATOLOGY/ONCOLOGY | Age: 69
End: 2019-04-10
Attending: NURSE PRACTITIONER
Payer: MEDICARE

## 2019-04-10 VITALS
HEART RATE: 76 BPM | BODY MASS INDEX: 36 KG/M2 | SYSTOLIC BLOOD PRESSURE: 152 MMHG | WEIGHT: 255.63 LBS | RESPIRATION RATE: 20 BRPM | DIASTOLIC BLOOD PRESSURE: 90 MMHG | OXYGEN SATURATION: 95 % | TEMPERATURE: 98 F

## 2019-04-10 DIAGNOSIS — C92.10 CML (CHRONIC MYELOCYTIC LEUKEMIA) (HCC): Primary | ICD-10-CM

## 2019-04-10 PROCEDURE — 99213 OFFICE O/P EST LOW 20 MIN: CPT | Performed by: SPECIALIST

## 2019-04-10 NOTE — PROGRESS NOTES
Patient is here today for follow up with Sandy Vicente for Chronic Myelocytic Leukemia. Currently on Nilotinib 300mg BID Patient stated pain feet and legs are rated a 7 on a scale of 0-10. Fatigue. Acne like sores on his head.  Gets overheated with activity or

## 2019-04-15 RX ORDER — CARVEDILOL 12.5 MG/1
TABLET ORAL
COMMUNITY
End: 2019-11-17 | Stop reason: SDUPTHER

## 2019-04-15 RX ORDER — ONDANSETRON HYDROCHLORIDE 8 MG/1
TABLET, FILM COATED ORAL
COMMUNITY

## 2019-04-15 RX ORDER — PRAVASTATIN SODIUM 40 MG
TABLET ORAL
COMMUNITY

## 2019-04-16 ENCOUNTER — TELEPHONE (OUTPATIENT)
Dept: HEMATOLOGY/ONCOLOGY | Facility: HOSPITAL | Age: 69
End: 2019-04-16

## 2019-04-16 NOTE — TELEPHONE ENCOUNTER
MD Mercy Delong RN             Let him know that his BCR/ABL is 0.28. Three months ago it was 0.30. Result is acceptable and I would not make a change in his treatment at this time.  He should return in 3 months to see me with labs

## 2019-04-21 NOTE — PROGRESS NOTES
[facility] Progress Note      Patient Name: Titi Hull   YOB: 1950  Medical Record Number: [id number]  Attending Physician: Joyce Spring M.D.      Date of Visit: 4/10/2019      Chief Complaint  Chronic myelogenous leukemia (historical data, reviewed)  Brother with sarcoma. Social History (historical data, reviewed)  Previous tobacco user but quit 1997; denies alcohol use.        Current Medications     triamcinolone acetonide 0.1 % External Cream Apply topically 2 (two) t Physical Examination   Constitutional      Well developed, well nourished. Appears close to chronological age. No apparent distress. Head                   Normocephalic and atraumatic. Eyes                  Conjunctiva clear; sclera anicteric.   ENM International Scale(%) 0.2865 %    EER BCR-ABL1, Major (P210) See Note    CBC W/ DIFFERENTIAL    Collection Time: 04/10/19  1:17 PM   Result Value Ref Range    WBC 7.5 4.0 - 11.0 x10(3) uL    RBC 4.55 3.80 - 5.80 x10(6)uL    HGB 12.5 (L) 13.0 - 17.5 g/dL by:    Froy Fajardo M.D.   [facility]  Director, Bone and Soft Tissue Sarcoma Program  Section of Hematology/Oncology, [facility]

## 2019-04-26 ENCOUNTER — TELEPHONE (OUTPATIENT)
Dept: HEMATOLOGY/ONCOLOGY | Facility: HOSPITAL | Age: 69
End: 2019-04-26

## 2019-05-08 RX ORDER — DULOXETIN HYDROCHLORIDE 60 MG/1
60 CAPSULE, DELAYED RELEASE ORAL
COMMUNITY
Start: 2018-04-20

## 2019-05-08 SDOH — HEALTH STABILITY: MENTAL HEALTH: HOW OFTEN DO YOU HAVE A DRINK CONTAINING ALCOHOL?: NEVER

## 2019-05-10 ENCOUNTER — OFFICE VISIT (OUTPATIENT)
Dept: CARDIOLOGY | Age: 69
End: 2019-05-10

## 2019-05-10 VITALS
WEIGHT: 261 LBS | HEIGHT: 70 IN | BODY MASS INDEX: 37.37 KG/M2 | DIASTOLIC BLOOD PRESSURE: 78 MMHG | HEART RATE: 72 BPM | SYSTOLIC BLOOD PRESSURE: 148 MMHG

## 2019-05-10 DIAGNOSIS — I10 HYPERTENSION, BENIGN: ICD-10-CM

## 2019-05-10 DIAGNOSIS — I42.0 DILATED IDIOPATHIC CARDIOMYOPATHY (CMD): ICD-10-CM

## 2019-05-10 DIAGNOSIS — I77.810 DILATED AORTIC ROOT (CMD): ICD-10-CM

## 2019-05-10 DIAGNOSIS — N18.30 CHRONIC RENAL INSUFFICIENCY, STAGE III (MODERATE) (CMD): ICD-10-CM

## 2019-05-10 DIAGNOSIS — R06.02 SHORTNESS OF BREATH: Primary | ICD-10-CM

## 2019-05-10 PROBLEM — C92.10 CHRONIC MYELOID LEUKEMIA (CMD): Status: ACTIVE | Noted: 2017-08-08

## 2019-05-10 PROCEDURE — 99214 OFFICE O/P EST MOD 30 MIN: CPT | Performed by: INTERNAL MEDICINE

## 2019-05-10 RX ORDER — LISINOPRIL 10 MG/1
10 TABLET ORAL DAILY
Qty: 90 TABLET | Refills: 3 | Status: SHIPPED | OUTPATIENT
Start: 2019-05-10

## 2019-05-10 RX ORDER — FUROSEMIDE 20 MG/1
20 TABLET ORAL DAILY
Qty: 90 TABLET | Refills: 1 | Status: SHIPPED | OUTPATIENT
Start: 2019-05-10

## 2019-05-10 SDOH — HEALTH STABILITY: MENTAL HEALTH: HOW OFTEN DO YOU HAVE A DRINK CONTAINING ALCOHOL?: NEVER

## 2019-05-10 ASSESSMENT — ENCOUNTER SYMPTOMS
SUSPICIOUS LESIONS: 0
WEIGHT LOSS: 0
HEMATOCHEZIA: 0
COUGH: 0
SHORTNESS OF BREATH: 1
CHILLS: 0
FEVER: 0
BRUISES/BLEEDS EASILY: 0
HEADACHES: 1
ROS SKIN COMMENTS: BLISTERS
ALLERGIC/IMMUNOLOGIC COMMENTS: NO NEW FOOD ALLERGIES
WEIGHT GAIN: 1
HEMOPTYSIS: 0

## 2019-07-09 NOTE — PROGRESS NOTES
[facility] Progress Note      Patient Name: Nguyen Dos Santos   YOB: 1950  Medical Record Number: [id number]  Attending Physician: Tawnya Vasquez M.D.      Date of Visit: 7/10/2019      Chief Complaint  Chronic myelogenous leukemia (historical data, reviewed by physician)  Previous tobacco user but quit 1997; denies alcohol use. Current Medications     triamcinolone acetonide 0.1 % External Cream Apply topically 2 (two) times daily as needed.  Disp: 45 g Rfl: 3   Miconazole Nitr Conjunctiva clear; sclera anicteric. ENMT                 External nose normal; external ears normal.  Neck                   Supple; no JVD. Hematologic/Lymphatic No cervical, supraclavicular, axillary lymphadenopathy.   Respiratory          Normal effo 7.70 x10 (3) uL    Neutrophil Absolute 4.44 1.50 - 7.70 x10(3) uL    Lymphocyte Absolute 1.99 1.00 - 4.00 x10(3) uL    Monocyte Absolute 0.39 0.10 - 1.00 x10(3) uL    Eosinophil Absolute 0.26 0.00 - 0.70 x10(3) uL    Basophil Absolute 0.05 0.00 - 0.20 x10(

## 2019-07-10 ENCOUNTER — OFFICE VISIT (OUTPATIENT)
Dept: HEMATOLOGY/ONCOLOGY | Age: 69
End: 2019-07-10
Attending: NURSE PRACTITIONER
Payer: MEDICARE

## 2019-07-10 DIAGNOSIS — F41.8 DEPRESSION WITH ANXIETY: ICD-10-CM

## 2019-07-10 DIAGNOSIS — C92.10 CML (CHRONIC MYELOCYTIC LEUKEMIA) (HCC): Primary | ICD-10-CM

## 2019-07-10 LAB
ALBUMIN SERPL-MCNC: 3.7 G/DL (ref 3.4–5)
ALBUMIN/GLOB SERPL: 1.1 {RATIO} (ref 1–2)
ALP LIVER SERPL-CCNC: 77 U/L (ref 45–117)
ALT SERPL-CCNC: 30 U/L (ref 16–61)
ANION GAP SERPL CALC-SCNC: 7 MMOL/L (ref 0–18)
AST SERPL-CCNC: 18 U/L (ref 15–37)
BASOPHILS # BLD AUTO: 0.05 X10(3) UL (ref 0–0.2)
BASOPHILS NFR BLD AUTO: 0.7 %
BILIRUB SERPL-MCNC: 1 MG/DL (ref 0.1–2)
BUN BLD-MCNC: 25 MG/DL (ref 7–18)
BUN/CREAT SERPL: 23.4 (ref 10–20)
CALCIUM BLD-MCNC: 8.8 MG/DL (ref 8.5–10.1)
CHLORIDE SERPL-SCNC: 109 MMOL/L (ref 98–112)
CO2 SERPL-SCNC: 25 MMOL/L (ref 21–32)
CREAT BLD-MCNC: 1.07 MG/DL (ref 0.7–1.3)
DEPRECATED RDW RBC AUTO: 48.5 FL (ref 35.1–46.3)
EOSINOPHIL # BLD AUTO: 0.26 X10(3) UL (ref 0–0.7)
EOSINOPHIL NFR BLD AUTO: 3.6 %
ERYTHROCYTE [DISTWIDTH] IN BLOOD BY AUTOMATED COUNT: 15.3 % (ref 11–15)
GLOBULIN PLAS-MCNC: 3.5 G/DL (ref 2.8–4.4)
GLUCOSE BLD-MCNC: 148 MG/DL (ref 70–99)
HCT VFR BLD AUTO: 39.9 % (ref 39–53)
HGB BLD-MCNC: 12.8 G/DL (ref 13–17.5)
IMM GRANULOCYTES # BLD AUTO: 0.06 X10(3) UL (ref 0–1)
IMM GRANULOCYTES NFR BLD: 0.8 %
LYMPHOCYTES # BLD AUTO: 1.99 X10(3) UL (ref 1–4)
LYMPHOCYTES NFR BLD AUTO: 27.7 %
M PROTEIN MFR SERPL ELPH: 7.2 G/DL (ref 6.4–8.2)
MCH RBC QN AUTO: 28 PG (ref 26–34)
MCHC RBC AUTO-ENTMCNC: 32.1 G/DL (ref 31–37)
MCV RBC AUTO: 87.3 FL (ref 80–100)
MONOCYTES # BLD AUTO: 0.39 X10(3) UL (ref 0.1–1)
MONOCYTES NFR BLD AUTO: 5.4 %
NEUTROPHILS # BLD AUTO: 4.44 X10 (3) UL (ref 1.5–7.7)
NEUTROPHILS # BLD AUTO: 4.44 X10(3) UL (ref 1.5–7.7)
NEUTROPHILS NFR BLD AUTO: 61.8 %
OSMOLALITY SERPL CALC.SUM OF ELEC: 299 MOSM/KG (ref 275–295)
PLATELET # BLD AUTO: 210 10(3)UL (ref 150–450)
POTASSIUM SERPL-SCNC: 4.3 MMOL/L (ref 3.5–5.1)
RBC # BLD AUTO: 4.57 X10(6)UL (ref 3.8–5.8)
SODIUM SERPL-SCNC: 141 MMOL/L (ref 136–145)
WBC # BLD AUTO: 7.2 X10(3) UL (ref 4–11)

## 2019-07-10 PROCEDURE — 99214 OFFICE O/P EST MOD 30 MIN: CPT | Performed by: SPECIALIST

## 2019-07-10 NOTE — PROGRESS NOTES
Patient is here today for follow up with Vicky Waite for Chronic Myelocytic Leukemia Currently on Tasigna 300mg BID. Patient denies pain. Fatigued. Stated feeling Anxious lately. Offered  to talk to him.  Medication list and medical history wer

## 2019-07-16 ENCOUNTER — TELEPHONE (OUTPATIENT)
Dept: FAMILY MEDICINE CLINIC | Facility: CLINIC | Age: 69
End: 2019-07-16

## 2019-07-16 DIAGNOSIS — Z12.5 PROSTATE CANCER SCREENING: ICD-10-CM

## 2019-07-16 DIAGNOSIS — E11.9 TYPE 2 DIABETES MELLITUS WITHOUT COMPLICATION, WITHOUT LONG-TERM CURRENT USE OF INSULIN (HCC): Primary | ICD-10-CM

## 2019-07-16 NOTE — TELEPHONE ENCOUNTER
I do not see that we have drawn a PSA previously     Are there any other labs?    CBC, CMP are current

## 2019-07-16 NOTE — TELEPHONE ENCOUNTER
CBC, CMP were just done on 7/10/19   I will place the orders for Lipid, PSA, HgbA1c, microalbumin         I called the patient and advised that he will need to fast for his visit.  Patient verbalized his understanding

## 2019-07-17 ENCOUNTER — APPOINTMENT (OUTPATIENT)
Dept: LAB | Age: 69
End: 2019-07-17
Attending: FAMILY MEDICINE
Payer: MEDICARE

## 2019-07-17 ENCOUNTER — OFFICE VISIT (OUTPATIENT)
Dept: FAMILY MEDICINE CLINIC | Facility: CLINIC | Age: 69
End: 2019-07-17
Payer: MEDICARE

## 2019-07-17 ENCOUNTER — TELEPHONE (OUTPATIENT)
Dept: HEMATOLOGY/ONCOLOGY | Facility: HOSPITAL | Age: 69
End: 2019-07-17

## 2019-07-17 VITALS
SYSTOLIC BLOOD PRESSURE: 130 MMHG | BODY MASS INDEX: 36 KG/M2 | DIASTOLIC BLOOD PRESSURE: 80 MMHG | TEMPERATURE: 97 F | OXYGEN SATURATION: 95 % | HEART RATE: 68 BPM | WEIGHT: 259 LBS | RESPIRATION RATE: 18 BRPM

## 2019-07-17 DIAGNOSIS — N18.30 CKD (CHRONIC KIDNEY DISEASE) STAGE 3, GFR 30-59 ML/MIN (HCC): ICD-10-CM

## 2019-07-17 DIAGNOSIS — C92.10 CML (CHRONIC MYELOCYTIC LEUKEMIA) (HCC): ICD-10-CM

## 2019-07-17 DIAGNOSIS — F41.9 ANXIETY: ICD-10-CM

## 2019-07-17 DIAGNOSIS — E11.9 TYPE 2 DIABETES MELLITUS WITHOUT COMPLICATION, WITHOUT LONG-TERM CURRENT USE OF INSULIN (HCC): ICD-10-CM

## 2019-07-17 DIAGNOSIS — C95.90 LEUKEMIA CONSULTATION (HCC): ICD-10-CM

## 2019-07-17 DIAGNOSIS — Z71.89 LEUKEMIA CONSULTATION (HCC): ICD-10-CM

## 2019-07-17 DIAGNOSIS — Z12.5 PROSTATE CANCER SCREENING: ICD-10-CM

## 2019-07-17 DIAGNOSIS — E11.9 TYPE 2 DIABETES MELLITUS WITHOUT COMPLICATION, WITHOUT LONG-TERM CURRENT USE OF INSULIN (HCC): Primary | ICD-10-CM

## 2019-07-17 LAB
BCR-ABL1, MAJOR (P210) RESULT: DETECTED
BCRABL1 INTERNATIONAL SCALE(%): 0.26 %
CHOLEST SMN-MCNC: 159 MG/DL (ref ?–200)
COMPLEXED PSA SERPL-MCNC: 0.63 NG/ML (ref ?–4)
CREAT UR-SCNC: 119 MG/DL
EST. AVERAGE GLUCOSE BLD GHB EST-MCNC: 151 MG/DL (ref 68–126)
HBA1C MFR BLD HPLC: 6.9 % (ref ?–5.7)
HDLC SERPL-MCNC: 39 MG/DL (ref 40–59)
LDLC SERPL CALC-MCNC: 71 MG/DL (ref ?–100)
MICROALBUMIN UR-MCNC: 4.23 MG/DL
MICROALBUMIN/CREAT 24H UR-RTO: 35.5 UG/MG (ref ?–30)
NONHDLC SERPL-MCNC: 120 MG/DL (ref ?–130)
TRIGL SERPL-MCNC: 244 MG/DL (ref 30–149)
VLDLC SERPL CALC-MCNC: 49 MG/DL (ref 0–30)

## 2019-07-17 PROCEDURE — 99214 OFFICE O/P EST MOD 30 MIN: CPT | Performed by: FAMILY MEDICINE

## 2019-07-17 PROCEDURE — 82043 UR ALBUMIN QUANTITATIVE: CPT

## 2019-07-17 PROCEDURE — 83036 HEMOGLOBIN GLYCOSYLATED A1C: CPT

## 2019-07-17 PROCEDURE — 82570 ASSAY OF URINE CREATININE: CPT

## 2019-07-17 PROCEDURE — 80061 LIPID PANEL: CPT

## 2019-07-17 PROCEDURE — 36415 COLL VENOUS BLD VENIPUNCTURE: CPT

## 2019-07-17 NOTE — TELEPHONE ENCOUNTER
MD Taamnna Main RN             Let him know that his BCR/ABL is 2.5 which is lower than previous so good result. We will see him in 3 months.

## 2019-07-17 NOTE — PROGRESS NOTES
Forrest Camacho is a 68year old male. Patient presents with:  Rash: fup, meds helped but it comes back. ...room 2  Ear Problem: ears plugged, also check LT second to last toe. ...       HPI:   Patient complains of head congestion and ears feeling plugge prior to visit.       Past Medical History:   Diagnosis Date   • Anxiety state, unspecified    • Depression    • Diabetes ([facility] [address]    • Esophageal reflux    • Other and unspecified hyperlipidemia    • Unspecified essential hypertension      Past Surgical Histo diabetic labs today. Continue current meds. Encouraged him to try the Allegra if those symptoms are bothering him like the congestion, runny nose, ear pressure. At least 25 minutes was spent with the patient.  15 of those 25 minutes was spent counseling

## 2019-07-18 DIAGNOSIS — Z12.5 PROSTATE CANCER SCREENING: ICD-10-CM

## 2019-07-18 DIAGNOSIS — E11.9 TYPE 2 DIABETES MELLITUS WITHOUT COMPLICATION, WITHOUT LONG-TERM CURRENT USE OF INSULIN (HCC): Primary | ICD-10-CM

## 2019-07-18 DIAGNOSIS — N18.30 CKD (CHRONIC KIDNEY DISEASE) STAGE 3, GFR 30-59 ML/MIN (HCC): ICD-10-CM

## 2019-07-23 ENCOUNTER — SOCIAL WORK SERVICES (OUTPATIENT)
Dept: HEMATOLOGY/ONCOLOGY | Facility: HOSPITAL | Age: 69
End: 2019-07-23

## 2019-08-03 ENCOUNTER — TELEPHONE (OUTPATIENT)
Dept: FAMILY MEDICINE CLINIC | Facility: CLINIC | Age: 69
End: 2019-08-03

## 2019-08-03 ENCOUNTER — OFFICE VISIT (OUTPATIENT)
Dept: FAMILY MEDICINE CLINIC | Facility: CLINIC | Age: 69
End: 2019-08-03
Payer: MEDICARE

## 2019-08-03 VITALS
TEMPERATURE: 99 F | OXYGEN SATURATION: 94 % | DIASTOLIC BLOOD PRESSURE: 74 MMHG | RESPIRATION RATE: 18 BRPM | WEIGHT: 259 LBS | HEART RATE: 70 BPM | SYSTOLIC BLOOD PRESSURE: 142 MMHG | BODY MASS INDEX: 36 KG/M2

## 2019-08-03 DIAGNOSIS — H65.02 ACUTE SEROUS OTITIS MEDIA OF LEFT EAR, RECURRENCE NOT SPECIFIED: ICD-10-CM

## 2019-08-03 DIAGNOSIS — J02.9 SORE THROAT: Primary | ICD-10-CM

## 2019-08-03 LAB
CONTROL LINE PRESENT WITH A CLEAR BACKGROUND (YES/NO): YES YES/NO
STREP GRP A CUL-SCR: NEGATIVE

## 2019-08-03 PROCEDURE — 87880 STREP A ASSAY W/OPTIC: CPT | Performed by: NURSE PRACTITIONER

## 2019-08-03 PROCEDURE — 99213 OFFICE O/P EST LOW 20 MIN: CPT | Performed by: NURSE PRACTITIONER

## 2019-08-03 RX ORDER — AMOXICILLIN 875 MG/1
875 TABLET, COATED ORAL 2 TIMES DAILY
Qty: 20 TABLET | Refills: 0 | Status: SHIPPED | OUTPATIENT
Start: 2019-08-03 | End: 2019-08-13

## 2019-08-03 NOTE — TELEPHONE ENCOUNTER
States he was seen a week and a half ago. Ears were plugged. Left ear is still plugged. Last night throat started hurting. Feels as if it is closing. Hurts terribly to swallow. Was on Allegra for allergies. Last night took Mucinex without relief.   D

## 2019-08-14 ENCOUNTER — OFFICE VISIT (OUTPATIENT)
Dept: FAMILY MEDICINE CLINIC | Facility: CLINIC | Age: 69
End: 2019-08-14
Payer: MEDICARE

## 2019-08-14 VITALS
DIASTOLIC BLOOD PRESSURE: 84 MMHG | HEIGHT: 71 IN | WEIGHT: 258.25 LBS | HEART RATE: 75 BPM | SYSTOLIC BLOOD PRESSURE: 138 MMHG | TEMPERATURE: 98 F | BODY MASS INDEX: 36.15 KG/M2 | OXYGEN SATURATION: 98 %

## 2019-08-14 DIAGNOSIS — R19.7 POSTCHOLECYSTECTOMY DIARRHEA: ICD-10-CM

## 2019-08-14 DIAGNOSIS — J01.00 ACUTE NON-RECURRENT MAXILLARY SINUSITIS: Primary | ICD-10-CM

## 2019-08-14 DIAGNOSIS — Z90.49 POSTCHOLECYSTECTOMY DIARRHEA: ICD-10-CM

## 2019-08-14 DIAGNOSIS — H69.82 DYSFUNCTION OF LEFT EUSTACHIAN TUBE: ICD-10-CM

## 2019-08-14 PROCEDURE — 99214 OFFICE O/P EST MOD 30 MIN: CPT | Performed by: FAMILY MEDICINE

## 2019-08-14 RX ORDER — CHOLESTYRAMINE 4 G/9G
4 POWDER, FOR SUSPENSION ORAL
Qty: 90 PACKET | Refills: 5 | Status: SHIPPED | OUTPATIENT
Start: 2019-08-14 | End: 2019-09-13

## 2019-08-14 RX ORDER — CHOLESTYRAMINE 4 G/9G
1 POWDER, FOR SUSPENSION ORAL
Qty: 378 G | Refills: 0 | Status: SHIPPED
Start: 2019-08-14 | End: 2019-08-14

## 2019-08-14 RX ORDER — ALBUTEROL SULFATE 90 UG/1
2 AEROSOL, METERED RESPIRATORY (INHALATION) EVERY 6 HOURS PRN
Qty: 1 INHALER | Refills: 0 | Status: SHIPPED | OUTPATIENT
Start: 2019-08-14

## 2019-08-14 RX ORDER — AZITHROMYCIN 250 MG/1
TABLET, FILM COATED ORAL
Qty: 6 TABLET | Refills: 0 | Status: SHIPPED | OUTPATIENT
Start: 2019-08-14 | End: 2019-09-25 | Stop reason: ALTCHOICE

## 2019-08-14 NOTE — PROGRESS NOTES
Luis M Giraldo    is a 68year old male. Patient presents with:  Urgent Care F/u: fup from [facility] on 08/03 for ears and sore throat-took round of amoxicilllin 875mg, still having congestion and ear issues. ...room 1      HPI:   Patient was given 10 days Rfl:      No current facility-administered medications on file prior to visit.       Past Medical History:   Diagnosis Date   • Anxiety state, unspecified    • CML (chronic myelocytic leukemia) ([facility] [address]    • Depression    • DM type 2 (diabetes mellitus, type 2 tube  Postcholecystectomy diarrhea    Treat symptomatically. Rest, fluids and Tylenol. Discussed danger signs including increased fever,chills, SOB and need to call if arise. RTC in 24-48 hrs if no improvement or anytime PRN.   Try Flonase nasal spray an

## 2019-08-15 ENCOUNTER — TELEPHONE (OUTPATIENT)
Dept: FAMILY MEDICINE CLINIC | Facility: CLINIC | Age: 69
End: 2019-08-15

## 2019-08-15 NOTE — TELEPHONE ENCOUNTER
Contacted patient, he states he was told by Tyrel Shaffer that the can is cheaper. Contacted [facility] to let them know to please fill the can.      While reviewing the orders in the system I see there is an order from yesterday for Cholestyramine 1 gram 3

## 2019-08-15 NOTE — TELEPHONE ENCOUNTER
Contacted [facility] pharmacist, informed of Dr. Viraj Morel response. Verbalizes understanding. Filled for 4g three times daily and for the can.

## 2019-08-21 ENCOUNTER — TELEPHONE (OUTPATIENT)
Dept: FAMILY MEDICINE CLINIC | Facility: CLINIC | Age: 69
End: 2019-08-21

## 2019-08-21 ENCOUNTER — OFFICE VISIT (OUTPATIENT)
Dept: FAMILY MEDICINE CLINIC | Facility: CLINIC | Age: 69
End: 2019-08-21
Payer: MEDICARE

## 2019-08-21 VITALS
DIASTOLIC BLOOD PRESSURE: 82 MMHG | HEART RATE: 69 BPM | TEMPERATURE: 98 F | BODY MASS INDEX: 36 KG/M2 | WEIGHT: 258 LBS | SYSTOLIC BLOOD PRESSURE: 140 MMHG | OXYGEN SATURATION: 95 %

## 2019-08-21 DIAGNOSIS — H69.82 DYSFUNCTION OF LEFT EUSTACHIAN TUBE: Primary | ICD-10-CM

## 2019-08-21 PROCEDURE — 99213 OFFICE O/P EST LOW 20 MIN: CPT | Performed by: FAMILY MEDICINE

## 2019-08-21 NOTE — TELEPHONE ENCOUNTER
Called the patient   Future Appointments   Date Time Provider Justin Solano   8/21/2019  1:00 PM Tad Álvarez DO [facility]   10/9/2019 12:00 PM Joe Robledo MD PF HEM [facility] [place]

## 2019-08-21 NOTE — PROGRESS NOTES
Nguyen Padron is a 68year old male. Patient presents with:  Ear Problem: congestion is better but ears are still plugged. ...room 1      HPI:   Patient was seen recently with a sinus infection. Sinus pressure and pain is better.   His left ear feels mouth daily. Disp:  Rfl:    Pravastatin Sodium (PRAVACHOL) 40 MG Oral Tab Take 40 mg by mouth nightly. Disp:  Rfl:      No current facility-administered medications on file prior to visit.       Past Medical History:   Diagnosis Date   • Anxiety state, unsp fever,chills, SOB and need to call if arise. RTC in 24-48 hrs if no improvement or anytime PRN. Start on Flonase nasal spray and Mucinex D. No relief over the next week, return to clinic. No orders of the defined types were placed in this encounter.

## 2019-08-21 NOTE — TELEPHONE ENCOUNTER
He finished the antibiotic that Dr Yee Drummond put him on  Congestion is a lot better, but his ears still feel full  When he does the valsalva maneuver his ears will clear and he can feel drainage go down the back of his throat and then they plug back up.

## 2019-08-22 ENCOUNTER — TELEPHONE (OUTPATIENT)
Dept: FAMILY MEDICINE CLINIC | Facility: CLINIC | Age: 69
End: 2019-08-22

## 2019-08-24 RX ORDER — LISINOPRIL 5 MG/1
5 TABLET ORAL DAILY
Qty: 1 TABLET | Refills: 0 | COMMUNITY
Start: 2019-08-22

## 2019-09-25 ENCOUNTER — OFFICE VISIT (OUTPATIENT)
Dept: FAMILY MEDICINE CLINIC | Facility: CLINIC | Age: 69
End: 2019-09-25
Payer: MEDICARE

## 2019-09-25 VITALS
TEMPERATURE: 98 F | SYSTOLIC BLOOD PRESSURE: 138 MMHG | DIASTOLIC BLOOD PRESSURE: 78 MMHG | WEIGHT: 252 LBS | BODY MASS INDEX: 35.28 KG/M2 | HEIGHT: 71 IN | OXYGEN SATURATION: 99 % | HEART RATE: 88 BPM

## 2019-09-25 DIAGNOSIS — H90.3 SENSORINEURAL HEARING LOSS (SNHL) OF BOTH EARS: ICD-10-CM

## 2019-09-25 DIAGNOSIS — R10.84 GENERALIZED ABDOMINAL PAIN: ICD-10-CM

## 2019-09-25 DIAGNOSIS — N40.1 BENIGN PROSTATIC HYPERPLASIA WITH NOCTURIA: Primary | ICD-10-CM

## 2019-09-25 DIAGNOSIS — R35.1 BENIGN PROSTATIC HYPERPLASIA WITH NOCTURIA: Primary | ICD-10-CM

## 2019-09-25 PROCEDURE — 99214 OFFICE O/P EST MOD 30 MIN: CPT | Performed by: FAMILY MEDICINE

## 2019-09-25 NOTE — PROGRESS NOTES
Ewa Hansen is a 68year old male. Patient presents with:  Ear Pain: He stated that his ears are still bothering him and he said that his stomache is also bothering him      HPI:   Patient has multiple complaints.   His ears continue to feel plugge Pravastatin Sodium (PRAVACHOL) 40 MG Oral Tab Take 40 mg by mouth nightly. Disp:  Rfl:    Miconazole Nitrate (ZEASORB-AF) 2 % External Powder Apply 1 Application topically as needed.  Disp: 43 g Rfl: 0     No current facility-administered medications on f nocturia  (primary encounter diagnosis)  Sensorineural hearing loss (snhl) of both ears  Generalized abdominal pain    It would be dangerous to try any other alpha blockers given his reaction to the Flomax.   I would like him to see Dr. Jil Bueno about his

## 2019-10-09 ENCOUNTER — APPOINTMENT (OUTPATIENT)
Dept: HEMATOLOGY/ONCOLOGY | Age: 69
End: 2019-10-09
Attending: NURSE PRACTITIONER
Payer: MEDICARE

## 2019-10-11 ENCOUNTER — TELEPHONE (OUTPATIENT)
Dept: CARDIOLOGY | Age: 69
End: 2019-10-11

## 2019-10-21 ENCOUNTER — MED REC SCAN ONLY (OUTPATIENT)
Dept: FAMILY MEDICINE CLINIC | Facility: CLINIC | Age: 69
End: 2019-10-21

## 2019-10-22 NOTE — PROGRESS NOTES
[facility] Progress Note      Patient Name: Joanne Rizo   YOB: 1950  Medical Record Number: [id number]  Attending Physician: Nallely Banks M.D.      Date of Visit: 10/23/2019      Chief Complaint  Chronic myelogenous leukemia Medications   lisinopril 10 MG Oral Tab, Take 1 tablet (10 mg total) by mouth daily. , Disp: 1 tablet, Rfl: 0  Albuterol Sulfate  (90 Base) MCG/ACT Inhalation Aero Soln, Inhale 2 puffs into the lungs every 6 (six) hours as needed for Wheezing., Disp: 35.70 kg/m²     Physical Examination   Constitutional      Well developed, well nourished. Appears close to chronological age. No apparent distress. Head                   Normocephalic and atraumatic.   Eyes                  Conjunctiva clear; sclera ani %    .0 150.0 - 450.0 10(3)uL    MCV 87.9 80.0 - 100.0 fL    MCH 28.4 26.0 - 34.0 pg    MCHC 32.3 31.0 - 37.0 g/dL    RDW 14.6 11.0 - 15.0 %    RDW-SD 46.7 (H) 35.1 - 46.3 fL    Neutrophil Absolute Prelim 4.30 1.50 - 7.70 x10 (3) uL    Neutrophil Ab

## 2019-10-23 ENCOUNTER — OFFICE VISIT (OUTPATIENT)
Dept: HEMATOLOGY/ONCOLOGY | Age: 69
End: 2019-10-23
Attending: NURSE PRACTITIONER
Payer: MEDICARE

## 2019-10-23 VITALS
RESPIRATION RATE: 20 BRPM | TEMPERATURE: 98 F | BODY MASS INDEX: 36 KG/M2 | HEART RATE: 65 BPM | WEIGHT: 256 LBS | DIASTOLIC BLOOD PRESSURE: 70 MMHG | SYSTOLIC BLOOD PRESSURE: 146 MMHG | OXYGEN SATURATION: 95 %

## 2019-10-23 DIAGNOSIS — C92.10 CML (CHRONIC MYELOCYTIC LEUKEMIA) (HCC): Primary | ICD-10-CM

## 2019-10-23 PROCEDURE — 99213 OFFICE O/P EST LOW 20 MIN: CPT | Performed by: SPECIALIST

## 2019-10-23 NOTE — PROGRESS NOTES
Patient is here today for follow up  with John Barton for Chronic Myeloid Leukemia. Currently on Nilotinib 300mg BID. Patient stated pain in bilateral feet is rated a 4 on a scale of 0-10. Feels tired all of the time. Appetite is good. No nausea.  Medication

## 2019-10-28 ENCOUNTER — TELEPHONE (OUTPATIENT)
Dept: HEMATOLOGY/ONCOLOGY | Facility: HOSPITAL | Age: 69
End: 2019-10-28

## 2019-11-18 RX ORDER — CARVEDILOL 12.5 MG/1
TABLET ORAL
Qty: 180 TABLET | Refills: 3 | Status: SHIPPED | OUTPATIENT
Start: 2019-11-18

## 2019-12-26 ENCOUNTER — TELEPHONE (OUTPATIENT)
Dept: FAMILY MEDICINE CLINIC | Facility: CLINIC | Age: 69
End: 2019-12-26

## 2019-12-26 NOTE — TELEPHONE ENCOUNTER
Spoke with patient who states he has had cough, chest congestion and SOB x 1 week. Patient states he has been taking Mucinex and Tylenol x 1 week with no help.  Informed patient if he feels that he is truly having SOB then he should go to the ER or urgent c

## 2019-12-26 NOTE — TELEPHONE ENCOUNTER
DEEP COUGH, CHEST CONGESTION, SINUS CONGESTION, SOB   NO FEVER   NO OPENINGS WITH PCP  FOR OVER A WEEK.   PLEASE ADVISE

## 2020-01-21 NOTE — PROGRESS NOTES
[facility] Progress Note      Patient Name: Ha Downs   YOB: 1950  Medical Record Number: [id number]  Attending Physician: Arti Polo M.D.      Date of Visit: 1/22/2020      Chief Complaint  Chronic myelogenous leukemia Medications   Prochlorperazine Maleate (COMPAZINE) 10 mg tablet, Take 1 tablet (10 mg total) by mouth every 6 (six) hours as needed for Nausea., Disp: 30 tablet, Rfl: 3  lisinopril 10 MG Oral Tab, Take 1 tablet (10 mg total) by mouth daily. , Disp: 1 tablet arm, Patient Position: Sitting, Cuff Size: large)   Pulse 71   Temp 96.8 °F (36 °C) (Tympanic)   Resp 20   Ht 1.803 m (5' 10.98\")   Wt 116.1 kg (256 lb)   SpO2 96%   BMI 35.72 kg/m²     Physical Examination   Constitutional      Well developed, well abrahan Collection Time: 01/22/20 11:32 AM   Result Value Ref Range    BCR-ABL1, T(9;22) Source Not Specified     BCR-ABL1 Major (P210) Result Detected (A)     BCR-ABL1 International Scale(%) 0.1244 %    EER BCR-ABL1, Major (P210) See Note    CBC W/ DIFFERENTIAL myelogenous leukemia: Today PCR for BCR/ABL is slightly lower. Continue nilotinib without modification. Planned Follow Up   Patient will return for follow up in 3 months. Risk Level: High - CML on therapy.      Electronically signed by:    Guy Blend

## 2020-01-22 ENCOUNTER — OFFICE VISIT (OUTPATIENT)
Dept: HEMATOLOGY/ONCOLOGY | Age: 70
End: 2020-01-22
Attending: NURSE PRACTITIONER
Payer: MEDICARE

## 2020-01-22 VITALS
HEIGHT: 70.98 IN | DIASTOLIC BLOOD PRESSURE: 73 MMHG | TEMPERATURE: 97 F | RESPIRATION RATE: 20 BRPM | BODY MASS INDEX: 35.84 KG/M2 | HEART RATE: 71 BPM | WEIGHT: 256 LBS | OXYGEN SATURATION: 96 % | SYSTOLIC BLOOD PRESSURE: 129 MMHG

## 2020-01-22 DIAGNOSIS — R10.84 ABDOMINAL PAIN, GENERALIZED: ICD-10-CM

## 2020-01-22 DIAGNOSIS — E11.9 DIABETES MELLITUS (HCC): Primary | ICD-10-CM

## 2020-01-22 DIAGNOSIS — C92.10 CML (CHRONIC MYELOCYTIC LEUKEMIA) (HCC): Primary | ICD-10-CM

## 2020-01-22 DIAGNOSIS — E11.9 DIABETES MELLITUS (HCC): ICD-10-CM

## 2020-01-22 LAB
ALBUMIN SERPL-MCNC: 3.4 G/DL (ref 3.4–5)
ALBUMIN/GLOB SERPL: 0.9 {RATIO} (ref 1–2)
ALP LIVER SERPL-CCNC: 73 U/L (ref 45–117)
ALT SERPL-CCNC: 23 U/L (ref 16–61)
AMYLASE SERPL-CCNC: 36 U/L (ref 25–115)
ANION GAP SERPL CALC-SCNC: 7 MMOL/L (ref 0–18)
AST SERPL-CCNC: 17 U/L (ref 15–37)
BASOPHILS # BLD AUTO: 0.03 X10(3) UL (ref 0–0.2)
BASOPHILS NFR BLD AUTO: 0.4 %
BILIRUB SERPL-MCNC: 1 MG/DL (ref 0.1–2)
BUN BLD-MCNC: 18 MG/DL (ref 7–18)
BUN/CREAT SERPL: 17.5 (ref 10–20)
CALCIUM BLD-MCNC: 9 MG/DL (ref 8.5–10.1)
CHLORIDE SERPL-SCNC: 107 MMOL/L (ref 98–112)
CHOLEST SMN-MCNC: 140 MG/DL (ref ?–200)
CO2 SERPL-SCNC: 26 MMOL/L (ref 21–32)
CREAT BLD-MCNC: 1.03 MG/DL (ref 0.7–1.3)
DEPRECATED RDW RBC AUTO: 47.2 FL (ref 35.1–46.3)
EOSINOPHIL # BLD AUTO: 0.36 X10(3) UL (ref 0–0.7)
EOSINOPHIL NFR BLD AUTO: 5 %
ERYTHROCYTE [DISTWIDTH] IN BLOOD BY AUTOMATED COUNT: 14.8 % (ref 11–15)
EST. AVERAGE GLUCOSE BLD GHB EST-MCNC: 143 MG/DL (ref 68–126)
GLOBULIN PLAS-MCNC: 3.8 G/DL (ref 2.8–4.4)
GLUCOSE BLD-MCNC: 156 MG/DL (ref 70–99)
HBA1C MFR BLD HPLC: 6.6 % (ref ?–5.7)
HCT VFR BLD AUTO: 39.5 % (ref 39–53)
HDLC SERPL-MCNC: 37 MG/DL (ref 40–59)
HGB BLD-MCNC: 12.6 G/DL (ref 13–17.5)
IMM GRANULOCYTES # BLD AUTO: 0.07 X10(3) UL (ref 0–1)
IMM GRANULOCYTES NFR BLD: 1 %
LDLC SERPL CALC-MCNC: 55 MG/DL (ref ?–100)
LIPASE SERPL-CCNC: 93 U/L (ref 73–393)
LYMPHOCYTES # BLD AUTO: 1.61 X10(3) UL (ref 1–4)
LYMPHOCYTES NFR BLD AUTO: 22.2 %
M PROTEIN MFR SERPL ELPH: 7.2 G/DL (ref 6.4–8.2)
MCH RBC QN AUTO: 27.9 PG (ref 26–34)
MCHC RBC AUTO-ENTMCNC: 31.9 G/DL (ref 31–37)
MCV RBC AUTO: 87.4 FL (ref 80–100)
MONOCYTES # BLD AUTO: 0.4 X10(3) UL (ref 0.1–1)
MONOCYTES NFR BLD AUTO: 5.5 %
NEUTROPHILS # BLD AUTO: 4.79 X10 (3) UL (ref 1.5–7.7)
NEUTROPHILS # BLD AUTO: 4.79 X10(3) UL (ref 1.5–7.7)
NEUTROPHILS NFR BLD AUTO: 65.9 %
NONHDLC SERPL-MCNC: 103 MG/DL (ref ?–130)
OSMOLALITY SERPL CALC.SUM OF ELEC: 295 MOSM/KG (ref 275–295)
PATIENT FASTING Y/N/NP: NO
PATIENT FASTING Y/N/NP: YES
PLATELET # BLD AUTO: 205 10(3)UL (ref 150–450)
POTASSIUM SERPL-SCNC: 4 MMOL/L (ref 3.5–5.1)
RBC # BLD AUTO: 4.52 X10(6)UL (ref 3.8–5.8)
SODIUM SERPL-SCNC: 140 MMOL/L (ref 136–145)
TRIGL SERPL-MCNC: 238 MG/DL (ref 30–149)
TSI SER-ACNC: 1.11 MIU/ML (ref 0.36–3.74)
VLDLC SERPL CALC-MCNC: 48 MG/DL (ref 0–30)
WBC # BLD AUTO: 7.3 X10(3) UL (ref 4–11)

## 2020-01-22 PROCEDURE — 99213 OFFICE O/P EST LOW 20 MIN: CPT | Performed by: SPECIALIST

## 2020-01-22 RX ORDER — PROCHLORPERAZINE MALEATE 10 MG
10 TABLET ORAL EVERY 6 HOURS PRN
Qty: 30 TABLET | Refills: 3 | Status: SHIPPED | OUTPATIENT
Start: 2020-01-22

## 2020-01-22 NOTE — PROGRESS NOTES
Patient is here today for follow up  with Alvarado Valle for [address] is on Nilotinib therapy. Patient stated neuropathic pain in lower legs and feet. Fatigued. Medication list and medical history were reviewed and updated.      Education Record    Time Lui

## 2020-01-27 LAB
BCR-ABL1, MAJOR (P210) RESULT: DETECTED
BCRABL1 INTERNATIONAL SCALE(%): 0.12 %

## 2020-01-28 ENCOUNTER — TELEPHONE (OUTPATIENT)
Dept: HEMATOLOGY/ONCOLOGY | Facility: HOSPITAL | Age: 70
End: 2020-01-28

## 2020-01-28 NOTE — TELEPHONE ENCOUNTER
MD Kunal Thapa, RN             Let him know that his BCR/ABL number is lower. Good result. Left detailed message on voicemail. Instructed to call with questions.

## 2020-04-02 ENCOUNTER — TELEPHONE (OUTPATIENT)
Dept: HEMATOLOGY/ONCOLOGY | Facility: HOSPITAL | Age: 70
End: 2020-04-02

## 2020-04-19 NOTE — PROGRESS NOTES
I called patient three times between 12 and 12:30 for his scheduled telephone visit at 12:00. He did not answer. Call when to voicemail each time; however, mailbox was full and I could not leave a message. No other phone number available to call.

## 2020-04-22 ENCOUNTER — VIRTUAL PHONE E/M (OUTPATIENT)
Dept: HEMATOLOGY/ONCOLOGY | Facility: HOSPITAL | Age: 70
End: 2020-04-22
Attending: NURSE PRACTITIONER
Payer: MEDICARE

## 2020-04-22 ENCOUNTER — TELEPHONE (OUTPATIENT)
Dept: HEMATOLOGY/ONCOLOGY | Facility: HOSPITAL | Age: 70
End: 2020-04-22

## 2020-04-22 NOTE — TELEPHONE ENCOUNTER
MD Doyle Ritchie RN             I called him 3 times between 12 and 12:30 for his telephone visit. No answer and his voicemail box was filled so I couldn't leave a message.  Can you try calling him later this afternoon and keeping h

## 2020-06-15 ENCOUNTER — TELEPHONE (OUTPATIENT)
Dept: HEMATOLOGY/ONCOLOGY | Facility: HOSPITAL | Age: 70
End: 2020-06-15

## 2020-06-15 NOTE — TELEPHONE ENCOUNTER
MD Remi Bazzi, RN             Patient had appointment in April but missed. I think he was admitted to an outside hospital. He should make a f/u appointment to see me.       Transferred to [facility] to schedule PL and MD follow up [id number] for

## 2020-06-21 NOTE — PROGRESS NOTES
[facility] Progress Note      Patient Name: Prince Mooney   YOB: 1950  Medical Record Number: [id number]  Attending Physician: Altaf Domínguez M.D.      Date of Visit: 6/23/2020      Chief Complaint  Chronic myelogenous leukemia data, reviewed by physician)  Brother with sarcoma. Social History (historical data, reviewed by physician)  Previous tobacco user but quit 1997; denies alcohol use.        Current Medications   QUEtiapine Fumarate 25 MG Oral Tab, Take 25 mg by mouth ni Take 40 mg by mouth nightly., Disp: , Rfl:       Allergies   Mr. Vernice Blizzard is allergic to flomax [tamsulosin] and primidone. Review of Systems   Constitutional      No fevers, chills, night sweats. Respiratory          Stable dyspnea on exertion.    Gas AST 14 (L) 15 - 37 U/L    ALT 23 16 - 61 U/L    Alkaline Phosphatase 65 45 - 117 U/L    Bilirubin, Total 1.0 0.1 - 2.0 mg/dL    Total Protein 6.9 6.4 - 8.2 g/dL    Albumin 3.4 3.4 - 5.0 g/dL    Globulin  3.5 2.8 - 4.4 g/dL    A/G Ratio 1.0 1.0 - 2.0    FAS

## 2020-06-23 ENCOUNTER — OFFICE VISIT (OUTPATIENT)
Dept: HEMATOLOGY/ONCOLOGY | Facility: HOSPITAL | Age: 70
End: 2020-06-23
Attending: SPECIALIST
Payer: MEDICARE

## 2020-06-23 VITALS
WEIGHT: 256 LBS | BODY MASS INDEX: 35.84 KG/M2 | HEIGHT: 70.98 IN | HEART RATE: 71 BPM | DIASTOLIC BLOOD PRESSURE: 69 MMHG | SYSTOLIC BLOOD PRESSURE: 111 MMHG | TEMPERATURE: 98 F | OXYGEN SATURATION: 98 % | RESPIRATION RATE: 18 BRPM

## 2020-06-23 DIAGNOSIS — C92.10 CML (CHRONIC MYELOCYTIC LEUKEMIA) (HCC): Primary | ICD-10-CM

## 2020-06-23 PROCEDURE — 99214 OFFICE O/P EST MOD 30 MIN: CPT | Performed by: SPECIALIST

## 2020-06-23 RX ORDER — ALPRAZOLAM 0.5 MG/1
0.5 TABLET ORAL NIGHTLY PRN
COMMUNITY
End: 2021-04-21 | Stop reason: ALTCHOICE

## 2020-06-23 RX ORDER — QUETIAPINE 100 MG/1
200 TABLET, FILM COATED ORAL 3 TIMES DAILY
COMMUNITY

## 2020-06-26 ENCOUNTER — TELEPHONE (OUTPATIENT)
Dept: HEMATOLOGY/ONCOLOGY | Facility: HOSPITAL | Age: 70
End: 2020-06-26

## 2020-06-26 NOTE — TELEPHONE ENCOUNTER
Isauro Cleveland MD  [facility]             Let him know that his BCR/ABL is 0.1 which is better than last time and exactly where I want it to be. Patient notified.

## 2020-07-10 DIAGNOSIS — C92.10 CML (CHRONIC MYELOCYTIC LEUKEMIA) (HCC): ICD-10-CM

## 2020-07-16 ENCOUNTER — TELEPHONE (OUTPATIENT)
Dept: HEMATOLOGY/ONCOLOGY | Facility: HOSPITAL | Age: 70
End: 2020-07-16

## 2020-07-16 NOTE — TELEPHONE ENCOUNTER
Monique Burton called saying the paperwork for his chemo meds did not go through on time, or something was missing, so he did not get his medication. He is calling to straighten this out so he can have a new order sent.  Please call

## 2020-09-22 ENCOUNTER — TELEPHONE (OUTPATIENT)
Dept: HEMATOLOGY/ONCOLOGY | Facility: HOSPITAL | Age: 70
End: 2020-09-22

## 2020-09-22 ENCOUNTER — TELEPHONE (OUTPATIENT)
Dept: HEMATOLOGY/ONCOLOGY | Age: 70
End: 2020-09-22

## 2020-09-22 NOTE — TELEPHONE ENCOUNTER
I called Darryn back and updated him that Dr Desiree Velez wants him to reschedule his appointment for a date after he is done with his quarantine. Darryn verbalized understanding. He asked to rebook now. I transferred the call to the PSR's.

## 2020-09-22 NOTE — TELEPHONE ENCOUNTER
Patient need to cancel his appointment he has Covid-19 and need to quarantine until 10/5 per Triage MARTHA Morales

## 2020-09-22 NOTE — TELEPHONE ENCOUNTER
Darryn wanted to let Dr Brina Polo know that his medication should be delivered tomorrow. He wanted to know when he should see Dr Brina Polo. I updated him that he has an appointment with Dr Brina Polo tomorrow at 11:30am at the [facility].      Berenice

## 2020-09-22 NOTE — TELEPHONE ENCOUNTER
Mulugeta Cramer got disconnected. I told him I will have a PSR call him right back. I updated Tata Oconnell. She will call him now.

## 2020-09-22 NOTE — TELEPHONE ENCOUNTER
Eugene Crawford [facility] been exposed to a Covid 19+ contact. His son did test positive. He was with him for 5 hrs on 9/19/2020.  The health department asked him to self quarantine unti

## 2020-09-23 ENCOUNTER — OFFICE VISIT (OUTPATIENT)
Dept: HEMATOLOGY/ONCOLOGY | Age: 70
End: 2020-09-23
Attending: SPECIALIST
Payer: MEDICARE

## 2020-09-23 DIAGNOSIS — C92.10 CML (CHRONIC MYELOCYTIC LEUKEMIA) (HCC): Primary | ICD-10-CM

## 2020-10-06 NOTE — PROGRESS NOTES
[facility] Progress Note      Patient Name: Agustín Logan   YOB: 1950  Medical Record Number: [id number]  Attending Physician: Rasheeda Medrano M.D.      Date of Visit: 10/7/2020      Chief Complaint  Chronic myelogeno (historical data, reviewed by physician)  Previous tobacco user but quit 1997; denies alcohol use. Current Medications     •  QUEtiapine Fumarate 100 MG Oral Tab, Take 200 mg by mouth 2 (two) times daily.   , Disp: , Rfl:     •  DULoxetine HCl 60 MG O Potassium 4.0 3.5 - 5.1 mmol/L    Chloride 107 98 - 112 mmol/L    CO2 27.0 21.0 - 32.0 mmol/L    Anion Gap 6 0 - 18 mmol/L    BUN 23 (H) 7 - 18 mg/dL    Creatinine 1.16 0.70 - 1.30 mg/dL    BUN/CREA Ratio 19.8 10.0 - 20.0    Calcium, Total 8.6 8.5 - 10. [facility], Kimberli Glass

## 2020-10-07 ENCOUNTER — OFFICE VISIT (OUTPATIENT)
Dept: HEMATOLOGY/ONCOLOGY | Age: 70
End: 2020-10-07
Attending: SPECIALIST
Payer: MEDICARE

## 2020-10-07 VITALS
OXYGEN SATURATION: 98 % | DIASTOLIC BLOOD PRESSURE: 67 MMHG | HEIGHT: 70.98 IN | WEIGHT: 249.38 LBS | HEART RATE: 76 BPM | SYSTOLIC BLOOD PRESSURE: 130 MMHG | RESPIRATION RATE: 18 BRPM | BODY MASS INDEX: 34.91 KG/M2 | TEMPERATURE: 98 F

## 2020-10-07 DIAGNOSIS — C92.10 CML (CHRONIC MYELOCYTIC LEUKEMIA) (HCC): ICD-10-CM

## 2020-10-07 PROCEDURE — 99213 OFFICE O/P EST LOW 20 MIN: CPT | Performed by: SPECIALIST

## 2020-10-13 ENCOUNTER — TELEPHONE (OUTPATIENT)
Dept: HEMATOLOGY/ONCOLOGY | Facility: HOSPITAL | Age: 70
End: 2020-10-13

## 2020-10-13 NOTE — TELEPHONE ENCOUNTER
Harvey Duane, MD Lizette Members, RN             Let him know that his BCR/ABL is unchanged as compared to 6 months ago. Good results. Patient notified.

## 2020-12-26 NOTE — PROGRESS NOTES
[facility] Progress Note      Patient Name: Sienna Teresa   YOB: 1950  Medical Record Number: [id number]  Attending Physician: Fernando Ordonez M.D.      Date of Visit: 1/6/2021      Chief Complaint  Chronic myelogenou cardiomyopathy with LVEF 52%; depression/anxiety; diabetes mellitus; hypertension; hyperlipidemia; GERD; coronary artery disease with evidence of MI on stress test 11/2016.     Past Surgical History (historical data, reviewed by physician)  Cholecystectomy; 96/60 (BP Location: Left arm, Patient Position: Sitting, Cuff Size: large)   Pulse 84   Temp 97 °F (36.1 °C) (Tympanic)   Resp 20   Ht 1.803 m (5' 10.98\")   Wt 114.9 kg (253 lb 6.4 oz)   SpO2 95%   BMI 35.36 kg/m²     Physical Examination   Constitutional Collection Time: 01/06/21 11:15 AM   Result Value Ref Range    WBC 6.5 4.0 - 11.0 x10(3) uL    RBC 4.08 3.80 - 5.80 x10(6)uL    HGB 12.7 (L) 13.0 - 17.5 g/dL    HCT 37.4 (L) 39.0 - 53.0 %    .0 150.0 - 450.0 10(3)uL    MCV 91.7 80.0 - 100.0 fL    MC

## 2021-01-06 ENCOUNTER — OFFICE VISIT (OUTPATIENT)
Dept: HEMATOLOGY/ONCOLOGY | Age: 71
End: 2021-01-06
Attending: SPECIALIST
Payer: MEDICARE

## 2021-01-06 VITALS
BODY MASS INDEX: 35.47 KG/M2 | DIASTOLIC BLOOD PRESSURE: 60 MMHG | SYSTOLIC BLOOD PRESSURE: 96 MMHG | HEART RATE: 84 BPM | HEIGHT: 70.98 IN | RESPIRATION RATE: 20 BRPM | OXYGEN SATURATION: 95 % | TEMPERATURE: 97 F | WEIGHT: 253.38 LBS

## 2021-01-06 DIAGNOSIS — C92.10 CML (CHRONIC MYELOCYTIC LEUKEMIA) (HCC): Primary | ICD-10-CM

## 2021-01-06 LAB
ALBUMIN SERPL-MCNC: 3.7 G/DL (ref 3.4–5)
ALBUMIN/GLOB SERPL: 1 {RATIO} (ref 1–2)
ALP LIVER SERPL-CCNC: 98 U/L
ALT SERPL-CCNC: 27 U/L
ANION GAP SERPL CALC-SCNC: 7 MMOL/L (ref 0–18)
AST SERPL-CCNC: 17 U/L (ref 15–37)
BASOPHILS # BLD AUTO: 0.08 X10(3) UL (ref 0–0.2)
BASOPHILS NFR BLD AUTO: 1.2 %
BILIRUB SERPL-MCNC: 0.4 MG/DL (ref 0.1–2)
BUN BLD-MCNC: 22 MG/DL (ref 7–18)
BUN/CREAT SERPL: 22.4 (ref 10–20)
CALCIUM BLD-MCNC: 9.4 MG/DL (ref 8.5–10.1)
CHLORIDE SERPL-SCNC: 103 MMOL/L (ref 98–112)
CO2 SERPL-SCNC: 26 MMOL/L (ref 21–32)
CREAT BLD-MCNC: 0.98 MG/DL
DEPRECATED RDW RBC AUTO: 50.1 FL (ref 35.1–46.3)
EOSINOPHIL # BLD AUTO: 0.22 X10(3) UL (ref 0–0.7)
EOSINOPHIL NFR BLD AUTO: 3.4 %
ERYTHROCYTE [DISTWIDTH] IN BLOOD BY AUTOMATED COUNT: 15.3 % (ref 11–15)
GLOBULIN PLAS-MCNC: 3.7 G/DL (ref 2.8–4.4)
GLUCOSE BLD-MCNC: 107 MG/DL (ref 70–99)
HCT VFR BLD AUTO: 37.4 %
HGB BLD-MCNC: 12.7 G/DL
IMM GRANULOCYTES # BLD AUTO: 0.04 X10(3) UL (ref 0–1)
IMM GRANULOCYTES NFR BLD: 0.6 %
LYMPHOCYTES # BLD AUTO: 1.87 X10(3) UL (ref 1–4)
LYMPHOCYTES NFR BLD AUTO: 28.7 %
M PROTEIN MFR SERPL ELPH: 7.4 G/DL (ref 6.4–8.2)
MCH RBC QN AUTO: 31.1 PG (ref 26–34)
MCHC RBC AUTO-ENTMCNC: 34 G/DL (ref 31–37)
MCV RBC AUTO: 91.7 FL
MONOCYTES # BLD AUTO: 0.45 X10(3) UL (ref 0.1–1)
MONOCYTES NFR BLD AUTO: 6.9 %
NEUTROPHILS # BLD AUTO: 3.85 X10 (3) UL (ref 1.5–7.7)
NEUTROPHILS # BLD AUTO: 3.85 X10(3) UL (ref 1.5–7.7)
NEUTROPHILS NFR BLD AUTO: 59.2 %
OSMOLALITY SERPL CALC.SUM OF ELEC: 286 MOSM/KG (ref 275–295)
PATIENT FASTING Y/N/NP: NO
PLATELET # BLD AUTO: 209 10(3)UL (ref 150–450)
POTASSIUM SERPL-SCNC: 4.1 MMOL/L (ref 3.5–5.1)
RBC # BLD AUTO: 4.08 X10(6)UL
SODIUM SERPL-SCNC: 136 MMOL/L (ref 136–145)
WBC # BLD AUTO: 6.5 X10(3) UL (ref 4–11)

## 2021-01-06 PROCEDURE — 99214 OFFICE O/P EST MOD 30 MIN: CPT | Performed by: SPECIALIST

## 2021-01-06 RX ORDER — PRAZOSIN HYDROCHLORIDE 1 MG/1
1 CAPSULE ORAL 2 TIMES DAILY
COMMUNITY

## 2021-01-06 NOTE — PROGRESS NOTES
Patient is here today for follow up with Jazmin Corrales for Chronic myelocytic leukemia. Current treatment Nilotinib 300mg BID. Patient denies pain. Medication list and medical history were reviewed and updated.      Education Record    Learner:  Patient and f

## 2021-01-10 LAB
BCR-ABL1, MAJOR (P210) RESULT: DETECTED
BCRABL1 INTERNATIONAL SCALE(%): 0.13 %

## 2021-01-11 ENCOUNTER — TELEPHONE (OUTPATIENT)
Dept: HEMATOLOGY/ONCOLOGY | Facility: HOSPITAL | Age: 71
End: 2021-01-11

## 2021-01-11 NOTE — TELEPHONE ENCOUNTER
Message  Received: Today  Message Contents   MD Tracey Argueta RN             Can let him know that his lab studies show that he remain in remission. Good result       Left detailed message on voicemail. Patient notified.  Instructed

## 2021-02-01 DIAGNOSIS — Z23 NEED FOR VACCINATION: ICD-10-CM

## 2021-04-07 ENCOUNTER — OFFICE VISIT (OUTPATIENT)
Dept: HEMATOLOGY/ONCOLOGY | Age: 71
End: 2021-04-07
Attending: SPECIALIST
Payer: MEDICARE

## 2021-04-07 DIAGNOSIS — C92.10 CML (CHRONIC MYELOCYTIC LEUKEMIA) (HCC): Primary | ICD-10-CM

## 2021-04-14 ENCOUNTER — APPOINTMENT (OUTPATIENT)
Dept: HEMATOLOGY/ONCOLOGY | Age: 71
End: 2021-04-14
Attending: SPECIALIST
Payer: MEDICARE

## 2021-04-20 NOTE — PROGRESS NOTES
[facility] Progress Note      Patient Name: Rossy Victoria   YOB: 1950  Medical Record Number: [id number]  Attending Physician: Gracie Ly M.D.      Date of Visit: 4/21/2021      Chief Complaint  Chronic myelogeno physician)  Previous tobacco user but quit 1997; denies alcohol use. Current Medications   Fenofibrate 145 MG Oral Tab, Take 145 mg by mouth daily. , Disp: , Rfl:   allopurinol 300 MG Oral Tab, Take 300 mg by mouth daily. , Disp: , Rfl:   Pantoprazole Conjunctiva clear; sclera anicteric. ENMT                 External nose normal; external ears normal.  Neck                   Supple. Hematologic/Lymphatic No cervical, supraclavicular lymphadenopathy.   Respiratory          Normal effort; no respiratory Absolute 0.39 0.10 - 1.00 x10(3) uL    Eosinophil Absolute 0.22 0.00 - 0.70 x10(3) uL    Basophil Absolute 0.02 0.00 - 0.20 x10(3) uL    Immature Granulocyte Absolute 0.04 0.00 - 1.00 x10(3) uL    Neutrophil % 65.4 %    Lymphocyte % 24.6 %    Monocyte % 5.

## 2021-04-21 ENCOUNTER — OFFICE VISIT (OUTPATIENT)
Dept: HEMATOLOGY/ONCOLOGY | Age: 71
End: 2021-04-21
Attending: SPECIALIST
Payer: MEDICARE

## 2021-04-21 VITALS
TEMPERATURE: 97 F | OXYGEN SATURATION: 99 % | DIASTOLIC BLOOD PRESSURE: 79 MMHG | WEIGHT: 244.81 LBS | HEART RATE: 75 BPM | SYSTOLIC BLOOD PRESSURE: 133 MMHG | HEIGHT: 70.98 IN | RESPIRATION RATE: 20 BRPM | BODY MASS INDEX: 34.27 KG/M2

## 2021-04-21 DIAGNOSIS — D64.9 NORMOCYTIC NORMOCHROMIC ANEMIA: Primary | ICD-10-CM

## 2021-04-21 DIAGNOSIS — C92.10 CML (CHRONIC MYELOCYTIC LEUKEMIA) (HCC): ICD-10-CM

## 2021-04-21 PROCEDURE — 99214 OFFICE O/P EST MOD 30 MIN: CPT | Performed by: SPECIALIST

## 2021-04-21 RX ORDER — FOLIC ACID 1 MG/1
1 TABLET ORAL DAILY
COMMUNITY

## 2021-04-21 RX ORDER — ALLOPURINOL 300 MG/1
300 TABLET ORAL DAILY
COMMUNITY
Start: 2021-03-09

## 2021-04-21 RX ORDER — PANTOPRAZOLE SODIUM 40 MG/1
40 TABLET, DELAYED RELEASE ORAL
COMMUNITY

## 2021-04-21 RX ORDER — FENOFIBRATE 145 MG/1
145 TABLET, COATED ORAL DAILY
COMMUNITY
Start: 2021-04-12

## 2021-04-21 NOTE — PROGRESS NOTES
Patient is here today for follow up with Román Laser for Chronic Myelocytic Leukemia. Nilotinib 300mg BID. Patient denies pain. Medication list and medical history were reviewed and updated.      Education Record    Learner:  Patient and son    Disease / Katie Leos

## 2021-04-26 ENCOUNTER — TELEPHONE (OUTPATIENT)
Dept: HEMATOLOGY/ONCOLOGY | Facility: HOSPITAL | Age: 71
End: 2021-04-26

## 2021-04-26 NOTE — TELEPHONE ENCOUNTER
MD Joe Scott, MARTHA  Let him know that his BCR/ABL PCR increased slightly from 0.1 to 0.2. Nothing to do. We will repeat it at his next appointment      Left detailed message on voicemail. Instructed to call as needed.

## 2021-06-11 ENCOUNTER — TELEPHONE (OUTPATIENT)
Dept: HEMATOLOGY/ONCOLOGY | Age: 71
End: 2021-06-11

## 2021-06-11 NOTE — TELEPHONE ENCOUNTER
Patient stated that he ordered his Tasigna on [facility] and he called and was told that his medication shipped out on June 5, 2021 and he never received it yet, Patient is completely out of medication today.

## 2021-06-11 NOTE — TELEPHONE ENCOUNTER
Spoke with pharmacy. Stated they Bree Schirmer did not ship on 6/5/2021 - They will ship medication out today 6/11/ 2021 - patient should receive it tomorrow. Patient notified. Stated understanding. [facility] office notified.  Patient will need ne

## 2021-06-14 ENCOUNTER — TELEPHONE (OUTPATIENT)
Dept: HEMATOLOGY/ONCOLOGY | Age: 71
End: 2021-06-14

## 2021-06-14 NOTE — TELEPHONE ENCOUNTER
Patient is calling to notify [facility] that he still have not received his medication Tasigna, please call patient to discuss.

## 2021-07-13 NOTE — PROGRESS NOTES
THE [facility] Hematology Oncology Group Progress Note      Patient Name: Eugene Vicente   YOB: 1950  Medical Record Number: [id number]  Attending Physician: Chacho Hurst M.D.      Date of Visit: 7/14/2021      Chief Complaint  Chronic myelogeno data, reviewed by physician)  Previous tobacco user but quit 1997; denies alcohol use. Current Medications   Fenofibrate 145 MG Oral Tab, Take 145 mg by mouth daily. , Disp: , Rfl:   allopurinol 300 MG Oral Tab, Take 300 mg by mouth daily. , Disp: , Rf 97.7 °F (36.5 °C) (Tympanic)   Resp 20   Ht 1.803 m (5' 10.98\")   Wt 111.4 kg (245 lb 8 oz)   SpO2 95%   BMI 34.26 kg/m²     Physical Examination   Constitutional      Well developed, well nourished. Appears close to chronological age.  No apparent distres %    .0 150.0 - 450.0 10(3)uL    MCV 90.7 80.0 - 100.0 fL    MCH 30.3 26.0 - 34.0 pg    MCHC 33.4 31.0 - 37.0 g/dL    RDW 13.7 11.0 - 15.0 %    RDW-SD 45.4 35.1 - 46.3 fL    Neutrophil Absolute Prelim 3.37 1.50 - 7.70 x10 (3) uL    Neutrophil Absolu

## 2021-07-14 ENCOUNTER — OFFICE VISIT (OUTPATIENT)
Dept: HEMATOLOGY/ONCOLOGY | Age: 71
End: 2021-07-14
Attending: SPECIALIST
Payer: MEDICARE

## 2021-07-14 VITALS
HEART RATE: 56 BPM | BODY MASS INDEX: 34.37 KG/M2 | DIASTOLIC BLOOD PRESSURE: 77 MMHG | RESPIRATION RATE: 20 BRPM | SYSTOLIC BLOOD PRESSURE: 129 MMHG | OXYGEN SATURATION: 95 % | HEIGHT: 70.98 IN | WEIGHT: 245.5 LBS | TEMPERATURE: 98 F

## 2021-07-14 DIAGNOSIS — C92.10 CML (CHRONIC MYELOCYTIC LEUKEMIA) (HCC): Primary | ICD-10-CM

## 2021-07-14 DIAGNOSIS — F41.9 ANXIETY: ICD-10-CM

## 2021-07-14 LAB
ALBUMIN SERPL-MCNC: 3.9 G/DL (ref 3.4–5)
ALBUMIN/GLOB SERPL: 1.2 {RATIO} (ref 1–2)
ALP LIVER SERPL-CCNC: 56 U/L
ALT SERPL-CCNC: 16 U/L
ANION GAP SERPL CALC-SCNC: 3 MMOL/L (ref 0–18)
AST SERPL-CCNC: 12 U/L (ref 15–37)
BASOPHILS # BLD AUTO: 0.03 X10(3) UL (ref 0–0.2)
BASOPHILS NFR BLD AUTO: 0.5 %
BILIRUB SERPL-MCNC: 0.3 MG/DL (ref 0.1–2)
BUN BLD-MCNC: 25 MG/DL (ref 7–18)
BUN/CREAT SERPL: 20.5 (ref 10–20)
CALCIUM BLD-MCNC: 9.3 MG/DL (ref 8.5–10.1)
CHLORIDE SERPL-SCNC: 108 MMOL/L (ref 98–112)
CO2 SERPL-SCNC: 27 MMOL/L (ref 21–32)
CREAT BLD-MCNC: 1.22 MG/DL
DEPRECATED RDW RBC AUTO: 45.4 FL (ref 35.1–46.3)
EOSINOPHIL # BLD AUTO: 0.25 X10(3) UL (ref 0–0.7)
EOSINOPHIL NFR BLD AUTO: 4.6 %
ERYTHROCYTE [DISTWIDTH] IN BLOOD BY AUTOMATED COUNT: 13.7 % (ref 11–15)
GLOBULIN PLAS-MCNC: 3.2 G/DL (ref 2.8–4.4)
GLUCOSE BLD-MCNC: 92 MG/DL (ref 70–99)
HCT VFR BLD AUTO: 35.9 %
HGB BLD-MCNC: 12 G/DL
IMM GRANULOCYTES # BLD AUTO: 0.04 X10(3) UL (ref 0–1)
IMM GRANULOCYTES NFR BLD: 0.7 %
LYMPHOCYTES # BLD AUTO: 1.49 X10(3) UL (ref 1–4)
LYMPHOCYTES NFR BLD AUTO: 27.3 %
M PROTEIN MFR SERPL ELPH: 7.1 G/DL (ref 6.4–8.2)
MCH RBC QN AUTO: 30.3 PG (ref 26–34)
MCHC RBC AUTO-ENTMCNC: 33.4 G/DL (ref 31–37)
MCV RBC AUTO: 90.7 FL
MONOCYTES # BLD AUTO: 0.28 X10(3) UL (ref 0.1–1)
MONOCYTES NFR BLD AUTO: 5.1 %
NEUTROPHILS # BLD AUTO: 3.37 X10 (3) UL (ref 1.5–7.7)
NEUTROPHILS # BLD AUTO: 3.37 X10(3) UL (ref 1.5–7.7)
NEUTROPHILS NFR BLD AUTO: 61.8 %
OSMOLALITY SERPL CALC.SUM OF ELEC: 290 MOSM/KG (ref 275–295)
PATIENT FASTING Y/N/NP: NO
PLATELET # BLD AUTO: 203 10(3)UL (ref 150–450)
POTASSIUM SERPL-SCNC: 4 MMOL/L (ref 3.5–5.1)
RBC # BLD AUTO: 3.96 X10(6)UL
SODIUM SERPL-SCNC: 138 MMOL/L (ref 136–145)
WBC # BLD AUTO: 5.5 X10(3) UL (ref 4–11)

## 2021-07-14 PROCEDURE — 99214 OFFICE O/P EST MOD 30 MIN: CPT | Performed by: SPECIALIST

## 2021-07-14 NOTE — PROGRESS NOTES
Patient is here today for follow up with Sandy Vicente for CML - Chronic myeloctyic leukemia. Patient is currently treated with Nilotinib 300mg BID. Patient denies pain. Denies adverse side effects from Tasign[place].  Medication list and medical history were review

## 2021-07-21 ENCOUNTER — APPOINTMENT (OUTPATIENT)
Dept: HEMATOLOGY/ONCOLOGY | Age: 71
End: 2021-07-21
Attending: SPECIALIST
Payer: MEDICARE

## 2021-07-21 LAB
BCR-ABL1, MAJOR (P210) RESULT: DETECTED
BCRABL1 INTERNATIONAL SCALE(%): 0.16 %

## 2021-07-22 ENCOUNTER — TELEPHONE (OUTPATIENT)
Dept: HEMATOLOGY/ONCOLOGY | Facility: HOSPITAL | Age: 71
End: 2021-07-22

## 2021-07-22 NOTE — TELEPHONE ENCOUNTER
MD Mercy Delong, MARTHA  Let him know that his CML number is good and lower than before. Patient notified. Left detailed message on voice mail. Instructed to call with questions.

## 2021-10-09 NOTE — PROGRESS NOTES
THE [facility] Hematology Oncology Group Progress Note      Patient Name: Tera Marsh   YOB: 1950  Medical Record Number: [id number]  Attending Physician: Froy Fajardo M.D.      Date of Visit: 10/13/2021      Chief Complaint  Chronic myelogen data, reviewed by physician)  Previous tobacco user but quit 1997; denies alcohol use. Current Medications   Fenofibrate 145 MG Oral Tab, Take 145 mg by mouth daily. , Disp: , Rfl:   allopurinol 300 MG Oral Tab, Take 300 mg by mouth daily. , Disp: , Rf 97.2 °F (36.2 °C) (Tympanic)   Resp 20   Ht 1.803 m (5' 10.98\")   Wt 112.9 kg (249 lb)   SpO2 95%   BMI 34.74 kg/m²     Physical Examination   Constitutional      Well developed, well nourished. Appears close to chronological age. No apparent distress. 10(3)uL    MCV 90.7 80.0 - 100.0 fL    MCH 29.9 26.0 - 34.0 pg    MCHC 32.9 31.0 - 37.0 g/dL    RDW 13.8 %    Neutrophil Absolute Prelim 3.76 1.50 - 7.70 x10 (3) uL    Neutrophil Absolute 3.76 1.50 - 7.70 x10(3) uL    Lymphocyte Absolute 1.44 1.00 - 4.00 x

## 2021-10-13 ENCOUNTER — OFFICE VISIT (OUTPATIENT)
Dept: HEMATOLOGY/ONCOLOGY | Age: 71
End: 2021-10-13
Attending: SPECIALIST
Payer: MEDICARE

## 2021-10-13 VITALS
RESPIRATION RATE: 20 BRPM | BODY MASS INDEX: 34.86 KG/M2 | WEIGHT: 249 LBS | SYSTOLIC BLOOD PRESSURE: 123 MMHG | TEMPERATURE: 97 F | OXYGEN SATURATION: 95 % | HEIGHT: 70.98 IN | DIASTOLIC BLOOD PRESSURE: 72 MMHG | HEART RATE: 69 BPM

## 2021-10-13 DIAGNOSIS — C92.10 CML (CHRONIC MYELOCYTIC LEUKEMIA) (HCC): Primary | ICD-10-CM

## 2021-10-13 DIAGNOSIS — F41.9 ANXIETY: ICD-10-CM

## 2021-10-13 PROCEDURE — 99214 OFFICE O/P EST MOD 30 MIN: CPT | Performed by: SPECIALIST

## 2021-10-13 NOTE — PROGRESS NOTES
Patient is here today for follow up with Mine Vo for chronic myelocytic leukemia. Currently on Nilotinib 300mg BID. Patient denies pain. Medication list and medical history were reviewed and updated.      Education Record    Learner:  Patient    Disease

## 2021-10-21 ENCOUNTER — TELEPHONE (OUTPATIENT)
Dept: HEMATOLOGY/ONCOLOGY | Facility: HOSPITAL | Age: 71
End: 2021-10-21

## 2021-10-21 NOTE — TELEPHONE ENCOUNTER
MD Otilio Gauthier, RN  Let him know BCR/ABL is good and lower than last time    Left detailed message on voicemail. Instructed to return call with questions.

## 2022-01-11 ENCOUNTER — TELEPHONE (OUTPATIENT)
Dept: HEMATOLOGY/ONCOLOGY | Facility: HOSPITAL | Age: 72
End: 2022-01-11

## 2022-01-11 NOTE — TELEPHONE ENCOUNTER
Abisai Vo called he is working with PCP he is Covid positive, canceling apt with Dr Marilu Ernandez for tomorrow and will reschedule when better.

## 2022-01-12 ENCOUNTER — APPOINTMENT (OUTPATIENT)
Dept: HEMATOLOGY/ONCOLOGY | Age: 72
End: 2022-01-12
Attending: SPECIALIST
Payer: MEDICARE

## 2022-02-16 ENCOUNTER — OFFICE VISIT (OUTPATIENT)
Dept: HEMATOLOGY/ONCOLOGY | Age: 72
End: 2022-02-16
Attending: SPECIALIST
Payer: MEDICARE

## 2022-02-16 ENCOUNTER — TELEPHONE (OUTPATIENT)
Dept: HEMATOLOGY/ONCOLOGY | Facility: HOSPITAL | Age: 72
End: 2022-02-16

## 2022-02-16 DIAGNOSIS — C92.10 CML (CHRONIC MYELOCYTIC LEUKEMIA) (HCC): Primary | ICD-10-CM

## 2022-02-16 NOTE — TELEPHONE ENCOUNTER
Left detailed message for patient. Spoke with friend Uriel Stanleyarely. Notified we are no longer able to run his labs for [facility] at the [place] office. Patient may keep todays appointment - but will need or go to [facility] post appointment for labs. Or    Patient may reschedule appointment for [facility] office for a Monday, Tuesday or Thursday. To return my call - phone number given.

## 2022-02-21 ENCOUNTER — OFFICE VISIT (OUTPATIENT)
Dept: HEMATOLOGY/ONCOLOGY | Facility: HOSPITAL | Age: 72
End: 2022-02-21
Attending: SPECIALIST
Payer: MEDICARE

## 2022-02-21 VITALS
HEART RATE: 76 BPM | DIASTOLIC BLOOD PRESSURE: 77 MMHG | RESPIRATION RATE: 18 BRPM | OXYGEN SATURATION: 95 % | TEMPERATURE: 98 F | SYSTOLIC BLOOD PRESSURE: 137 MMHG | BODY MASS INDEX: 34 KG/M2 | WEIGHT: 242 LBS

## 2022-02-21 DIAGNOSIS — C92.10 CML (CHRONIC MYELOCYTIC LEUKEMIA) (HCC): ICD-10-CM

## 2022-02-21 LAB
ALBUMIN SERPL-MCNC: 3.6 G/DL (ref 3.4–5)
ALBUMIN/GLOB SERPL: 1 {RATIO} (ref 1–2)
ALP LIVER SERPL-CCNC: 50 U/L
ALT SERPL-CCNC: 17 U/L
ANION GAP SERPL CALC-SCNC: 3 MMOL/L (ref 0–18)
AST SERPL-CCNC: 11 U/L (ref 15–37)
BASOPHILS # BLD AUTO: 0.04 X10(3) UL (ref 0–0.2)
BASOPHILS NFR BLD AUTO: 0.6 %
BILIRUB SERPL-MCNC: 0.3 MG/DL (ref 0.1–2)
BUN BLD-MCNC: 22 MG/DL (ref 7–18)
CALCIUM BLD-MCNC: 9.3 MG/DL (ref 8.5–10.1)
CHLORIDE SERPL-SCNC: 107 MMOL/L (ref 98–112)
CO2 SERPL-SCNC: 27 MMOL/L (ref 21–32)
CREAT BLD-MCNC: 1.24 MG/DL
EOSINOPHIL # BLD AUTO: 0.3 X10(3) UL (ref 0–0.7)
EOSINOPHIL NFR BLD AUTO: 4.6 %
ERYTHROCYTE [DISTWIDTH] IN BLOOD BY AUTOMATED COUNT: 15.1 %
FASTING STATUS PATIENT QL REPORTED: NO
GLOBULIN PLAS-MCNC: 3.7 G/DL (ref 2.8–4.4)
GLUCOSE BLD-MCNC: 84 MG/DL (ref 70–99)
HCT VFR BLD AUTO: 34.6 %
HGB BLD-MCNC: 11.2 G/DL
IMM GRANULOCYTES # BLD AUTO: 0.05 X10(3) UL (ref 0–1)
IMM GRANULOCYTES NFR BLD: 0.8 %
LYMPHOCYTES # BLD AUTO: 1.67 X10(3) UL (ref 1–4)
LYMPHOCYTES NFR BLD AUTO: 25.5 %
MCH RBC QN AUTO: 28.6 PG (ref 26–34)
MCHC RBC AUTO-ENTMCNC: 32.4 G/DL (ref 31–37)
MCV RBC AUTO: 88.5 FL
MONOCYTES # BLD AUTO: 0.43 X10(3) UL (ref 0.1–1)
MONOCYTES NFR BLD AUTO: 6.6 %
NEUTROPHILS # BLD AUTO: 4.06 X10 (3) UL (ref 1.5–7.7)
NEUTROPHILS # BLD AUTO: 4.06 X10(3) UL (ref 1.5–7.7)
NEUTROPHILS NFR BLD AUTO: 61.9 %
OSMOLALITY SERPL CALC.SUM OF ELEC: 287 MOSM/KG (ref 275–295)
PLATELET # BLD AUTO: 205 10(3)UL (ref 150–450)
POTASSIUM SERPL-SCNC: 4.1 MMOL/L (ref 3.5–5.1)
PROT SERPL-MCNC: 7.3 G/DL (ref 6.4–8.2)
RBC # BLD AUTO: 3.91 X10(6)UL
SODIUM SERPL-SCNC: 137 MMOL/L (ref 136–145)
WBC # BLD AUTO: 6.6 X10(3) UL (ref 4–11)

## 2022-02-21 PROCEDURE — 99214 OFFICE O/P EST MOD 30 MIN: CPT | Performed by: SPECIALIST

## 2022-02-21 NOTE — PROGRESS NOTES
Patient is here today for follow up with Melodie Nielson for CML - chronic myelocytic leukemia. Patient denies pain. Fatigued. Currently on Nilotinib 300mg BID. Medication list and medical history were reviewed and updated. Education Record    Learner:  Patient    Disease / Diagnosis: CML  Barriers / Limitations:  None   Comments:    Method:  Brief focused, Discussion, Printed material and Reinforcement   Comments:    General Topics:  Medication, Pain, Procedure and Plan of care reviewed   Comments:    Outcome:  Shows understanding   Comments:      Drug assistance forms for Nilotinib  brought to [facility] post appointment. AVS provided and follow up reviewed. Patient instructed to call as needed.

## 2022-03-02 LAB
BCR-ABL1, MAJOR (P210) RESULT: DETECTED
BCRABL1 INTERNATIONAL SCALE(%): 0.04 %

## 2022-03-04 ENCOUNTER — TELEPHONE (OUTPATIENT)
Dept: HEMATOLOGY/ONCOLOGY | Facility: HOSPITAL | Age: 72
End: 2022-03-04

## 2022-03-04 NOTE — TELEPHONE ENCOUNTER
MD Maria Tucker, RN  Let him know that his BCR/ABL is down to 0.04 from 0.12. Excellent result. We'll see him in 3 weeks. If you want, you can tell him that the [place] option is back on but he has to have a 2pm appointment to make sure that he makes the 3:30 . If it seems too complicated, just leave it in [place]. Patient notified. Patient will call if he want to reschedule his appointment to [place].

## 2022-05-24 ENCOUNTER — APPOINTMENT (OUTPATIENT)
Dept: HEMATOLOGY/ONCOLOGY | Facility: HOSPITAL | Age: 72
End: 2022-05-24
Attending: SPECIALIST
Payer: MEDICARE

## 2022-05-25 ENCOUNTER — OFFICE VISIT (OUTPATIENT)
Dept: HEMATOLOGY/ONCOLOGY | Age: 72
End: 2022-05-25
Attending: SPECIALIST
Payer: MEDICARE

## 2022-05-25 VITALS
BODY MASS INDEX: 33.88 KG/M2 | TEMPERATURE: 98 F | SYSTOLIC BLOOD PRESSURE: 120 MMHG | RESPIRATION RATE: 20 BRPM | WEIGHT: 242 LBS | OXYGEN SATURATION: 95 % | DIASTOLIC BLOOD PRESSURE: 72 MMHG | HEIGHT: 70.98 IN | HEART RATE: 76 BPM

## 2022-05-25 DIAGNOSIS — C92.10 CML (CHRONIC MYELOCYTIC LEUKEMIA) (HCC): Primary | ICD-10-CM

## 2022-05-25 LAB
ALBUMIN SERPL-MCNC: 3.6 G/DL (ref 3.4–5)
ALBUMIN/GLOB SERPL: 1.1 {RATIO} (ref 1–2)
ALP LIVER SERPL-CCNC: 45 U/L
ALT SERPL-CCNC: 16 U/L
ANION GAP SERPL CALC-SCNC: 5 MMOL/L (ref 0–18)
AST SERPL-CCNC: 12 U/L (ref 15–37)
BASOPHILS # BLD AUTO: 0.03 X10(3) UL (ref 0–0.2)
BASOPHILS NFR BLD AUTO: 0.6 %
BILIRUB SERPL-MCNC: 0.2 MG/DL (ref 0.1–2)
BUN BLD-MCNC: 25 MG/DL (ref 7–18)
CALCIUM BLD-MCNC: 9.3 MG/DL (ref 8.5–10.1)
CHLORIDE SERPL-SCNC: 108 MMOL/L (ref 98–112)
CO2 SERPL-SCNC: 26 MMOL/L (ref 21–32)
CREAT BLD-MCNC: 1.32 MG/DL
EOSINOPHIL # BLD AUTO: 0.25 X10(3) UL (ref 0–0.7)
EOSINOPHIL NFR BLD AUTO: 5 %
ERYTHROCYTE [DISTWIDTH] IN BLOOD BY AUTOMATED COUNT: 14.1 %
FASTING STATUS PATIENT QL REPORTED: NO
GLOBULIN PLAS-MCNC: 3.4 G/DL (ref 2.8–4.4)
GLUCOSE BLD-MCNC: 157 MG/DL (ref 70–99)
HCT VFR BLD AUTO: 34.7 %
HGB BLD-MCNC: 11.3 G/DL
IMM GRANULOCYTES # BLD AUTO: 0.03 X10(3) UL (ref 0–1)
IMM GRANULOCYTES NFR BLD: 0.6 %
LYMPHOCYTES # BLD AUTO: 1.26 X10(3) UL (ref 1–4)
LYMPHOCYTES NFR BLD AUTO: 25.2 %
MCH RBC QN AUTO: 28.3 PG (ref 26–34)
MCHC RBC AUTO-ENTMCNC: 32.6 G/DL (ref 31–37)
MCV RBC AUTO: 87 FL
MONOCYTES # BLD AUTO: 0.26 X10(3) UL (ref 0.1–1)
MONOCYTES NFR BLD AUTO: 5.2 %
NEUTROPHILS # BLD AUTO: 3.17 X10 (3) UL (ref 1.5–7.7)
NEUTROPHILS # BLD AUTO: 3.17 X10(3) UL (ref 1.5–7.7)
NEUTROPHILS NFR BLD AUTO: 63.4 %
OSMOLALITY SERPL CALC.SUM OF ELEC: 296 MOSM/KG (ref 275–295)
PLATELET # BLD AUTO: 226 10(3)UL (ref 150–450)
POTASSIUM SERPL-SCNC: 3.7 MMOL/L (ref 3.5–5.1)
PROT SERPL-MCNC: 7 G/DL (ref 6.4–8.2)
RBC # BLD AUTO: 3.99 X10(6)UL
SODIUM SERPL-SCNC: 139 MMOL/L (ref 136–145)
WBC # BLD AUTO: 5 X10(3) UL (ref 4–11)

## 2022-05-25 PROCEDURE — 99214 OFFICE O/P EST MOD 30 MIN: CPT | Performed by: SPECIALIST

## 2022-05-25 NOTE — PROGRESS NOTES
Patient is here today for 3month follow up with Mary Wu for Chronic Myelocytic Leukemia. Currently therapy Nilotinib 300mg BID. Patient denies pain. Medication list and medical history were reviewed and updated. Education Record    Learner:  Patient    Disease / Diagnosis: Chronic Myelocytic Leukemia    Barriers / Limitations:  None   Comments:    Method:  Brief focused, Discussion, Printed material and Reinforcement   Comments:    General Topics:  Medication, Pain, Procedure and Plan of care reviewed   Comments:    Outcome:  Shows understanding   Comments:    AVS provided and follow up reviewed. Patient instructed to call as needed.

## 2022-06-03 LAB
BCR-ABL1, MAJOR (P210) RESULT: DETECTED
BCRABL1 INTERNATIONAL SCALE(%): 0.12 %

## 2022-06-06 ENCOUNTER — TELEPHONE (OUTPATIENT)
Dept: HEMATOLOGY/ONCOLOGY | Facility: HOSPITAL | Age: 72
End: 2022-06-06

## 2022-06-06 NOTE — TELEPHONE ENCOUNTER
MD Marybel Lewis RN  Let him know that his PCR number is a bit higher than it was 3 months ago but is the same as 6 and 9 months ago. I am happy with the results. We'll see him in 3 months. Patient notified.

## 2022-08-31 ENCOUNTER — OFFICE VISIT (OUTPATIENT)
Dept: HEMATOLOGY/ONCOLOGY | Age: 72
End: 2022-08-31
Attending: SPECIALIST
Payer: MEDICARE

## 2022-08-31 VITALS
BODY MASS INDEX: 33.86 KG/M2 | DIASTOLIC BLOOD PRESSURE: 70 MMHG | WEIGHT: 241.88 LBS | HEART RATE: 70 BPM | SYSTOLIC BLOOD PRESSURE: 177 MMHG | OXYGEN SATURATION: 96 % | TEMPERATURE: 97 F | HEIGHT: 70.98 IN

## 2022-08-31 DIAGNOSIS — C92.10 CML (CHRONIC MYELOCYTIC LEUKEMIA) (HCC): Primary | ICD-10-CM

## 2022-08-31 LAB
ALBUMIN SERPL-MCNC: 3.6 G/DL (ref 3.4–5)
ALBUMIN/GLOB SERPL: 1.1 {RATIO} (ref 1–2)
ALP LIVER SERPL-CCNC: 50 U/L
ALT SERPL-CCNC: 15 U/L
ANION GAP SERPL CALC-SCNC: 5 MMOL/L (ref 0–18)
AST SERPL-CCNC: 14 U/L (ref 15–37)
BASOPHILS # BLD AUTO: 0.03 X10(3) UL (ref 0–0.2)
BASOPHILS NFR BLD AUTO: 0.6 %
BILIRUB SERPL-MCNC: 0.2 MG/DL (ref 0.1–2)
BUN BLD-MCNC: 28 MG/DL (ref 7–18)
CALCIUM BLD-MCNC: 9.2 MG/DL (ref 8.5–10.1)
CHLORIDE SERPL-SCNC: 107 MMOL/L (ref 98–112)
CO2 SERPL-SCNC: 26 MMOL/L (ref 21–32)
CREAT BLD-MCNC: 1.26 MG/DL
EOSINOPHIL # BLD AUTO: 0.23 X10(3) UL (ref 0–0.7)
EOSINOPHIL NFR BLD AUTO: 4.4 %
ERYTHROCYTE [DISTWIDTH] IN BLOOD BY AUTOMATED COUNT: 14.1 %
FASTING STATUS PATIENT QL REPORTED: NO
GFR SERPLBLD BASED ON 1.73 SQ M-ARVRAT: 60 ML/MIN/1.73M2 (ref 60–?)
GLOBULIN PLAS-MCNC: 3.3 G/DL (ref 2.8–4.4)
GLUCOSE BLD-MCNC: 143 MG/DL (ref 70–99)
HCT VFR BLD AUTO: 34.8 %
HGB BLD-MCNC: 11.3 G/DL
IMM GRANULOCYTES # BLD AUTO: 0.03 X10(3) UL (ref 0–1)
IMM GRANULOCYTES NFR BLD: 0.6 %
LYMPHOCYTES # BLD AUTO: 1.34 X10(3) UL (ref 1–4)
LYMPHOCYTES NFR BLD AUTO: 25.8 %
MCH RBC QN AUTO: 28.4 PG (ref 26–34)
MCHC RBC AUTO-ENTMCNC: 32.5 G/DL (ref 31–37)
MCV RBC AUTO: 87.4 FL
MONOCYTES # BLD AUTO: 0.28 X10(3) UL (ref 0.1–1)
MONOCYTES NFR BLD AUTO: 5.4 %
NEUTROPHILS # BLD AUTO: 3.29 X10 (3) UL (ref 1.5–7.7)
NEUTROPHILS # BLD AUTO: 3.29 X10(3) UL (ref 1.5–7.7)
NEUTROPHILS NFR BLD AUTO: 63.2 %
OSMOLALITY SERPL CALC.SUM OF ELEC: 294 MOSM/KG (ref 275–295)
PLATELET # BLD AUTO: 222 10(3)UL (ref 150–450)
POTASSIUM SERPL-SCNC: 3.8 MMOL/L (ref 3.5–5.1)
PROT SERPL-MCNC: 6.9 G/DL (ref 6.4–8.2)
RBC # BLD AUTO: 3.98 X10(6)UL
SODIUM SERPL-SCNC: 138 MMOL/L (ref 136–145)
WBC # BLD AUTO: 5.2 X10(3) UL (ref 4–11)

## 2022-08-31 PROCEDURE — 81206 BCR/ABL1 GENE MAJOR BP: CPT | Performed by: SPECIALIST

## 2022-08-31 PROCEDURE — 99211 OFF/OP EST MAY X REQ PHY/QHP: CPT

## 2022-08-31 PROCEDURE — 36415 COLL VENOUS BLD VENIPUNCTURE: CPT

## 2022-08-31 PROCEDURE — 85025 COMPLETE CBC W/AUTO DIFF WBC: CPT | Performed by: SPECIALIST

## 2022-08-31 PROCEDURE — 80053 COMPREHEN METABOLIC PANEL: CPT | Performed by: SPECIALIST

## 2022-08-31 NOTE — PROGRESS NOTES
Patient is here today for follow up with Chad Perez for CML (Chronic Myelocytic Leukemia)  Patient is currently on Nilotinib 300mg two times daily. Patient denies pain. Fatigued. Denies additional adverse side effects from Nilotinib therapy. Medication list,medical history and toxicities were reviewed and updated. Education Record    Learner:  Patient      Disease / Diagnosis: CML - Chronic Myelocytic Leukemia     Barriers / Limitations:  None   Comments:    Method:  Brief focused, Discussion, Printed material and Reinforcement   Comments:    General Topics:  Medication, Pain, Procedure and Plan of care reviewed   Comments:    Outcome:  Shows understanding   Comments:  AVS provided and follow up reviewed. Patient instructed to call as needed.

## 2022-09-07 ENCOUNTER — TELEPHONE (OUTPATIENT)
Dept: HEMATOLOGY/ONCOLOGY | Facility: HOSPITAL | Age: 72
End: 2022-09-07

## 2022-09-07 LAB
BCR-ABL1, MAJOR (P210) RESULT: DETECTED
BCRABL1 INTERNATIONAL SCALE(%): 0.09 %

## 2022-09-07 NOTE — TELEPHONE ENCOUNTER
MD Shayy Malave, RN  Let him know that his BCR/ABL number is good. Lower than last time and consistent with a major molecular remission which is the goal of therapy. Patient notified.

## 2022-11-30 ENCOUNTER — OFFICE VISIT (OUTPATIENT)
Dept: HEMATOLOGY/ONCOLOGY | Age: 72
End: 2022-11-30
Attending: SPECIALIST
Payer: MEDICARE

## 2022-11-30 VITALS
HEART RATE: 80 BPM | BODY MASS INDEX: 34.51 KG/M2 | OXYGEN SATURATION: 96 % | RESPIRATION RATE: 20 BRPM | HEIGHT: 70.98 IN | TEMPERATURE: 97 F | SYSTOLIC BLOOD PRESSURE: 129 MMHG | DIASTOLIC BLOOD PRESSURE: 71 MMHG | WEIGHT: 246.5 LBS

## 2022-11-30 DIAGNOSIS — C92.10 CML (CHRONIC MYELOCYTIC LEUKEMIA) (HCC): Primary | ICD-10-CM

## 2022-11-30 LAB
ALBUMIN SERPL-MCNC: 3.8 G/DL (ref 3.4–5)
ALBUMIN/GLOB SERPL: 1.2 {RATIO} (ref 1–2)
ALP LIVER SERPL-CCNC: 50 U/L
ALT SERPL-CCNC: 21 U/L
ANION GAP SERPL CALC-SCNC: 7 MMOL/L (ref 0–18)
AST SERPL-CCNC: 16 U/L (ref 15–37)
BASOPHILS # BLD AUTO: 0.03 X10(3) UL (ref 0–0.2)
BASOPHILS NFR BLD AUTO: 0.6 %
BILIRUB SERPL-MCNC: 0.3 MG/DL (ref 0.1–2)
BUN BLD-MCNC: 31 MG/DL (ref 7–18)
CALCIUM BLD-MCNC: 9 MG/DL (ref 8.5–10.1)
CHLORIDE SERPL-SCNC: 107 MMOL/L (ref 98–112)
CO2 SERPL-SCNC: 26 MMOL/L (ref 21–32)
CREAT BLD-MCNC: 1.3 MG/DL
EOSINOPHIL # BLD AUTO: 0.16 X10(3) UL (ref 0–0.7)
EOSINOPHIL NFR BLD AUTO: 3.2 %
ERYTHROCYTE [DISTWIDTH] IN BLOOD BY AUTOMATED COUNT: 14 %
FASTING STATUS PATIENT QL REPORTED: NO
GFR SERPLBLD BASED ON 1.73 SQ M-ARVRAT: 58 ML/MIN/1.73M2 (ref 60–?)
GLOBULIN PLAS-MCNC: 3.2 G/DL (ref 2.8–4.4)
GLUCOSE BLD-MCNC: 156 MG/DL (ref 70–99)
HCT VFR BLD AUTO: 35.4 %
HGB BLD-MCNC: 11.3 G/DL
IMM GRANULOCYTES # BLD AUTO: 0.03 X10(3) UL (ref 0–1)
IMM GRANULOCYTES NFR BLD: 0.6 %
LYMPHOCYTES # BLD AUTO: 1.39 X10(3) UL (ref 1–4)
LYMPHOCYTES NFR BLD AUTO: 27.7 %
MCH RBC QN AUTO: 28.1 PG (ref 26–34)
MCHC RBC AUTO-ENTMCNC: 31.9 G/DL (ref 31–37)
MCV RBC AUTO: 88.1 FL
MONOCYTES # BLD AUTO: 0.29 X10(3) UL (ref 0.1–1)
MONOCYTES NFR BLD AUTO: 5.8 %
NEUTROPHILS # BLD AUTO: 3.11 X10 (3) UL (ref 1.5–7.7)
NEUTROPHILS # BLD AUTO: 3.11 X10(3) UL (ref 1.5–7.7)
NEUTROPHILS NFR BLD AUTO: 62.1 %
OSMOLALITY SERPL CALC.SUM OF ELEC: 300 MOSM/KG (ref 275–295)
PLATELET # BLD AUTO: 213 10(3)UL (ref 150–450)
POTASSIUM SERPL-SCNC: 3.8 MMOL/L (ref 3.5–5.1)
PROT SERPL-MCNC: 7 G/DL (ref 6.4–8.2)
RBC # BLD AUTO: 4.02 X10(6)UL
SODIUM SERPL-SCNC: 140 MMOL/L (ref 136–145)
WBC # BLD AUTO: 5 X10(3) UL (ref 4–11)

## 2022-11-30 PROCEDURE — 99214 OFFICE O/P EST MOD 30 MIN: CPT | Performed by: SPECIALIST

## 2022-12-07 LAB
BCR-ABL1, MAJOR (P210) RESULT: DETECTED
BCRABL1 INTERNATIONAL SCALE(%): 0.21 %

## 2022-12-13 ENCOUNTER — TELEPHONE (OUTPATIENT)
Dept: HEMATOLOGY/ONCOLOGY | Facility: HOSPITAL | Age: 72
End: 2022-12-13

## 2022-12-13 NOTE — TELEPHONE ENCOUNTER
Mich Officer, MD Deb Horne RN  Let him know that his PCR is a bit higher than previous but there is nothing to worry about right now or change. Make sure that he is taking his nilotinib faithfully. We will repeat labs in 3 months. Patient notified.

## 2023-02-28 ENCOUNTER — OFFICE VISIT (OUTPATIENT)
Dept: HEMATOLOGY/ONCOLOGY | Age: 73
End: 2023-02-28
Attending: SPECIALIST
Payer: MEDICARE

## 2023-02-28 VITALS
RESPIRATION RATE: 20 BRPM | BODY MASS INDEX: 36.77 KG/M2 | HEIGHT: 70.98 IN | HEART RATE: 72 BPM | TEMPERATURE: 98 F | SYSTOLIC BLOOD PRESSURE: 147 MMHG | DIASTOLIC BLOOD PRESSURE: 76 MMHG | WEIGHT: 262.63 LBS | OXYGEN SATURATION: 92 %

## 2023-02-28 DIAGNOSIS — C92.10 CML (CHRONIC MYELOCYTIC LEUKEMIA) (HCC): Primary | ICD-10-CM

## 2023-02-28 LAB
ALBUMIN SERPL-MCNC: 3.6 G/DL (ref 3.4–5)
ALBUMIN/GLOB SERPL: 1 {RATIO} (ref 1–2)
ALP LIVER SERPL-CCNC: 59 U/L
ALT SERPL-CCNC: 18 U/L
ANION GAP SERPL CALC-SCNC: 8 MMOL/L (ref 0–18)
AST SERPL-CCNC: 16 U/L (ref 15–37)
BASOPHILS # BLD AUTO: 0.04 X10(3) UL (ref 0–0.2)
BASOPHILS NFR BLD AUTO: 0.7 %
BILIRUB SERPL-MCNC: 0.2 MG/DL (ref 0.1–2)
BUN BLD-MCNC: 30 MG/DL (ref 7–18)
CALCIUM BLD-MCNC: 9.2 MG/DL (ref 8.5–10.1)
CHLORIDE SERPL-SCNC: 107 MMOL/L (ref 98–112)
CO2 SERPL-SCNC: 24 MMOL/L (ref 21–32)
CREAT BLD-MCNC: 1.28 MG/DL
EOSINOPHIL # BLD AUTO: 0.21 X10(3) UL (ref 0–0.7)
EOSINOPHIL NFR BLD AUTO: 3.6 %
ERYTHROCYTE [DISTWIDTH] IN BLOOD BY AUTOMATED COUNT: 14 %
FASTING STATUS PATIENT QL REPORTED: NO
GFR SERPLBLD BASED ON 1.73 SQ M-ARVRAT: 59 ML/MIN/1.73M2 (ref 60–?)
GLOBULIN PLAS-MCNC: 3.5 G/DL (ref 2.8–4.4)
GLUCOSE BLD-MCNC: 148 MG/DL (ref 70–99)
HCT VFR BLD AUTO: 35.4 %
HGB BLD-MCNC: 11.5 G/DL
IMM GRANULOCYTES # BLD AUTO: 0.05 X10(3) UL (ref 0–1)
IMM GRANULOCYTES NFR BLD: 0.9 %
LYMPHOCYTES # BLD AUTO: 1.41 X10(3) UL (ref 1–4)
LYMPHOCYTES NFR BLD AUTO: 24 %
MCH RBC QN AUTO: 28.6 PG (ref 26–34)
MCHC RBC AUTO-ENTMCNC: 32.5 G/DL (ref 31–37)
MCV RBC AUTO: 88.1 FL
MONOCYTES # BLD AUTO: 0.38 X10(3) UL (ref 0.1–1)
MONOCYTES NFR BLD AUTO: 6.5 %
NEUTROPHILS # BLD AUTO: 3.79 X10 (3) UL (ref 1.5–7.7)
NEUTROPHILS # BLD AUTO: 3.79 X10(3) UL (ref 1.5–7.7)
NEUTROPHILS NFR BLD AUTO: 64.3 %
OSMOLALITY SERPL CALC.SUM OF ELEC: 297 MOSM/KG (ref 275–295)
PLATELET # BLD AUTO: 213 10(3)UL (ref 150–450)
POTASSIUM SERPL-SCNC: 3.8 MMOL/L (ref 3.5–5.1)
PROT SERPL-MCNC: 7.1 G/DL (ref 6.4–8.2)
RBC # BLD AUTO: 4.02 X10(6)UL
SODIUM SERPL-SCNC: 139 MMOL/L (ref 136–145)
WBC # BLD AUTO: 5.9 X10(3) UL (ref 4–11)

## 2023-02-28 PROCEDURE — 99214 OFFICE O/P EST MOD 30 MIN: CPT | Performed by: SPECIALIST

## 2023-02-28 NOTE — PROGRESS NOTES
Patient is here today for follow up with Sylvai Vasques  for Chronic Myelocytic Leukemia. Current treatment Nilotinib 300mg two times daily. Patient denies pain. Stated fatigue. Denies nausea. Occasional constipation. Medication list,medical history and toxicities were reviewed and updated. Education Record    Learner:  Patient      Disease / Diagnosis: Chronic Myelocytic Leukemia    Barriers / Limitations:  None   Comments:    Method:  Brief focused, Discussion, Printed material and Reinforcement   Comments:    General Topics:  Medication, Pain, Procedure and Plan of care reviewed   Comments:    Outcome:  Shows understanding   Comments:    AVS provided and follow up reviewed. Patient instructed to call as needed.

## 2023-03-07 LAB
BCR-ABL1, MAJOR (P210) RESULT: DETECTED
BCRABL1 INTERNATIONAL SCALE(%): 0.12 %

## 2023-03-08 ENCOUNTER — TELEPHONE (OUTPATIENT)
Dept: HEMATOLOGY/ONCOLOGY | Facility: HOSPITAL | Age: 73
End: 2023-03-08

## 2023-03-08 NOTE — TELEPHONE ENCOUNTER
----- Message from Kemi Peralta MD sent at 3/8/2023  6:27 AM CST -----  Please call patient and let him know that his BCR/ABL result is good. It is lower than last time. Spoke with pt. He v/u.

## 2023-05-30 ENCOUNTER — APPOINTMENT (OUTPATIENT)
Dept: HEMATOLOGY/ONCOLOGY | Age: 73
End: 2023-05-30
Attending: SPECIALIST
Payer: MEDICARE

## 2023-06-05 NOTE — PROGRESS NOTES
THE [facility] Hematology Oncology Group Progress Note      Patient Name: Nathaly Lion   YOB: 1950  Medical Record Number: [id number]  Attending Physician: Charissa Rapp M.D. The [id number] makes medical notes like these available to patients in the interest of transparency. Please be advised this is a medical document. Medical documents are intended to carry relevant information, facts as evident, and the clinical opinion of the practitioner. The medical note is intended as peer to peer communication and may appear blunt or direct. It is written in medical language and may contain abbreviations or verbiage that are unfamiliar. Date of Visit: 6/6/2023      Chief Complaint  Chronic myelogenous leukemia - follow up. Oncologic History  Kelsey Ortiz is a 74year old male undergoing cardiac workup for coronary artery disease, dilated cardiomyopathy, and dyspnea on exertion who was found to have a markedly elevated white blood cell count on complete blood count. On 07/21/2017 complete blood count showed a WBC of 128 K/mcl with 40% neutrophils, 28% bands, 5% lymphocytes, 9% monocytes, 1% eosinophils, 3% basophils, 9 metamyelocytes, 4% myelocytes, 1% blasts; hemoglobin 9.5 g/dl with elevated MCV, and platelet count 813 K/mcl. 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 Pretreatment BCR/ABL was 0.43896 (ratio) and 41.7541%. Patient was started on hydroxyurea while arrangements made for patient to receive nilotinib. At beginning of 09/2017 patient started nilotinib 300 mg bid. When white blood cell count fell below 80 K/mcl, hydroxyurea was discontinued. History of Present Illness  Patient returns for follow up. He denies any new adverse effects to nilotinib. He continues to complain of his chronic anxiety/paranoia related issues. Performance Status   [place] 50% - Requires frequent medical help and considerable assistance.     Past Medical History (historical data, reviewed by physician)  Dilated cardiomyopathy with LVEF 52%; depression/anxiety; diabetes mellitus; hypertension; hyperlipidemia; GERD; coronary artery disease with evidence of MI on stress test 11/2016. Past Surgical History (historical data, reviewed by physician)  Cholecystectomy; excisional cervical lymph node biopsy. Family History (historical data, reviewed by physician)  Brother with sarcoma. Social History (historical data, reviewed by physician)  Previous tobacco user but quit 1997; denies alcohol use. Current Medications   Polyethylene Glycol 3350 (MIRALAX) 17 GM/SCOOP Oral Powder, Take 17 g by mouth daily. , Disp: , Rfl:   Nilotinib HCl 150 MG Oral Cap, Take 300 mg by mouth 2 (two) times daily. , Disp: 360 capsule, Rfl: 3  Fenofibrate 145 MG Oral Tab, Take 1 tablet (145 mg total) by mouth daily. , Disp: , Rfl:   allopurinol 300 MG Oral Tab, Take 1 tablet (300 mg total) by mouth daily. , Disp: , Rfl:   Pantoprazole Sodium 40 MG Oral Tab EC, Take 1 tablet (40 mg total) by mouth 2 (two) times daily before meals. , Disp: , Rfl:   folic acid 1 MG Oral Tab, Take 1 tablet (1 mg total) by mouth daily. , Disp: , Rfl:   CARBAMAZEPINE OR, Take 100 mg by mouth 2 (two) times daily. 1 and 1/2 tablets twice daily, Disp: , Rfl:   Prazosin HCl 1 MG Oral Cap, Take 1 capsule (1 mg total) by mouth 2 (two) times daily. , Disp: , Rfl:   QUEtiapine Fumarate 100 MG Oral Tab, Take 2 tablets (200 mg total) by mouth 3 (three) times daily. , Disp: , Rfl:   Prochlorperazine Maleate (COMPAZINE) 10 mg tablet, Take 1 tablet (10 mg total) by mouth every 6 (six) hours as needed for Nausea., Disp: 30 tablet, Rfl: 3  lisinopril 5 MG Oral Tab, Take 1 tablet (5 mg total) by mouth daily. , Disp: 1 tablet, Rfl: 0  Albuterol Sulfate  (90 Base) MCG/ACT Inhalation Aero Soln, Inhale 2 puffs into the lungs every 6 (six) hours as needed for Wheezing., Disp: 1 Inhaler, Rfl: 0  carvedilol 3.125 MG Oral Tab, Take 1 tablet (3.125 mg total) by mouth 2 (two) times daily with meals. , Disp: , Rfl:   DULoxetine HCl 60 MG Oral Cap DR Particles, Take 1 capsule (60 mg total) by mouth daily. , Disp: , Rfl:   aspirin 81 MG Oral Chew Tab, Chew 1 tablet (81 mg total) by mouth daily. Took 4 this a.m., Disp: , Rfl:   Ondansetron HCl (ZOFRAN) 8 MG tablet, Take 1 tablet (8 mg total) by mouth every 8 (eight) hours as needed for Nausea., Disp: 30 tablet, Rfl: 3  multiple vitamin Oral Chew Tab, Chew 1 tablet by mouth daily. , Disp: , Rfl:     Allergies   Mr. Denton Nunes is allergic to flomax [tamsulosin], haldol [haloperidol], and primidone. Vital Signs   There were no vitals taken for this visit. Physical Examination   Constitutional      Well developed, well nourished. Appears close to chronological age. No apparent distress. Head                   Normocephalic and atraumatic. Eyes                  Conjunctiva clear; sclera anicteric. ENMT                 External nose normal; external ears normal.  Neck                   Supple. Hematologic/Lymphatic No cervical, supraclavicular lymphadenopathy. Respiratory          Normal effort; no respiratory distress; clear to auscultation bilaterally. Cardiovascular     Regular rate and rhythm. Abdomen            Soft; nontender; no hepatomegaly; spleen tip not palpable. Extremities          No lower extremity edema. Neurologic           Motor and sensory grossly intact.       Laboratory   Recent Results (from the past 24 hour(s))   COMP METABOLIC PANEL (14)    Collection Time: 06/06/23  1:15 PM   Result Value Ref Range    Glucose 186 (H) 70 - 99 mg/dL    Sodium 138 136 - 145 mmol/L    Potassium 4.0 3.5 - 5.1 mmol/L    Chloride 105 98 - 112 mmol/L    CO2 29.0 21.0 - 32.0 mmol/L    Anion Gap 4 0 - 18 mmol/L    BUN 28 (H) 7 - 18 mg/dL    Creatinine 1.32 (H) 0.70 - 1.30 mg/dL    Calcium, Total 9.1 8.5 - 10.1 mg/dL    Calculated Osmolality 296 (H) 275 - 295 mOsm/kg    eGFR-Cr 56 (L) >=60 mL/min/1.73m2    AST 25 15 - 37 U/L    ALT 28 16 - 61 U/L    Alkaline Phosphatase 70 45 - 117 U/L    Bilirubin, Total 0.3 0.1 - 2.0 mg/dL    Total Protein 7.1 6.4 - 8.2 g/dL    Albumin 3.5 3.4 - 5.0 g/dL    Globulin  3.6 2.8 - 4.4 g/dL    A/G Ratio 1.0 1.0 - 2.0    Patient Fasting for CMP? No    CBC W/ DIFFERENTIAL    Collection Time: 06/06/23  1:15 PM   Result Value Ref Range    WBC 5.9 4.0 - 11.0 x10(3) uL    RBC 3.98 3.80 - 5.80 x10(6)uL    HGB 11.3 (L) 13.0 - 17.5 g/dL    HCT 34.6 (L) 39.0 - 53.0 %    .0 150.0 - 450.0 10(3)uL    MCV 86.9 80.0 - 100.0 fL    MCH 28.4 26.0 - 34.0 pg    MCHC 32.7 31.0 - 37.0 g/dL    RDW 14.1 %    Neutrophil Absolute Prelim 3.90 1.50 - 7.70 x10 (3) uL    Neutrophil Absolute 3.90 1.50 - 7.70 x10(3) uL    Lymphocyte Absolute 1.35 1.00 - 4.00 x10(3) uL    Monocyte Absolute 0.37 0.10 - 1.00 x10(3) uL    Eosinophil Absolute 0.22 0.00 - 0.70 x10(3) uL    Basophil Absolute 0.03 0.00 - 0.20 x10(3) uL    Immature Granulocyte Absolute 0.07 0.00 - 1.00 x10(3) uL    Neutrophil % 65.7 %    Lymphocyte % 22.7 %    Monocyte % 6.2 %    Eosinophil % 3.7 %    Basophil % 0.5 %    Immature Granulocyte % 1.2 %     Impression and Plan   1. Chronic myelogenous leukemia: Tolerating nivolumab well. Await results of PCR for BCR/ABL. If stable, there will be no recommended changes to his therapy. Planned Follow Up   Patient will return for follow up in 3 months; he will be contacted with his PCR results. Electronically signed by:    Lorrie Tolbert M.D.   Medical Director of Oncology Services  [facility]

## 2023-06-06 ENCOUNTER — OFFICE VISIT (OUTPATIENT)
Dept: HEMATOLOGY/ONCOLOGY | Age: 73
End: 2023-06-06
Attending: SPECIALIST
Payer: MEDICARE

## 2023-06-06 VITALS
BODY MASS INDEX: 38.08 KG/M2 | SYSTOLIC BLOOD PRESSURE: 148 MMHG | HEART RATE: 75 BPM | HEIGHT: 70.98 IN | TEMPERATURE: 99 F | WEIGHT: 272 LBS | RESPIRATION RATE: 18 BRPM | OXYGEN SATURATION: 96 % | DIASTOLIC BLOOD PRESSURE: 66 MMHG

## 2023-06-06 DIAGNOSIS — C92.10 CML (CHRONIC MYELOCYTIC LEUKEMIA) (HCC): Primary | ICD-10-CM

## 2023-06-06 LAB
ALBUMIN SERPL-MCNC: 3.5 G/DL (ref 3.4–5)
ALBUMIN/GLOB SERPL: 1 {RATIO} (ref 1–2)
ALP LIVER SERPL-CCNC: 70 U/L
ALT SERPL-CCNC: 28 U/L
ANION GAP SERPL CALC-SCNC: 4 MMOL/L (ref 0–18)
AST SERPL-CCNC: 25 U/L (ref 15–37)
BASOPHILS # BLD AUTO: 0.03 X10(3) UL (ref 0–0.2)
BASOPHILS NFR BLD AUTO: 0.5 %
BILIRUB SERPL-MCNC: 0.3 MG/DL (ref 0.1–2)
BUN BLD-MCNC: 28 MG/DL (ref 7–18)
CALCIUM BLD-MCNC: 9.1 MG/DL (ref 8.5–10.1)
CHLORIDE SERPL-SCNC: 105 MMOL/L (ref 98–112)
CO2 SERPL-SCNC: 29 MMOL/L (ref 21–32)
CREAT BLD-MCNC: 1.32 MG/DL
EOSINOPHIL # BLD AUTO: 0.22 X10(3) UL (ref 0–0.7)
EOSINOPHIL NFR BLD AUTO: 3.7 %
ERYTHROCYTE [DISTWIDTH] IN BLOOD BY AUTOMATED COUNT: 14.1 %
FASTING STATUS PATIENT QL REPORTED: NO
GFR SERPLBLD BASED ON 1.73 SQ M-ARVRAT: 56 ML/MIN/1.73M2 (ref 60–?)
GLOBULIN PLAS-MCNC: 3.6 G/DL (ref 2.8–4.4)
GLUCOSE BLD-MCNC: 186 MG/DL (ref 70–99)
HCT VFR BLD AUTO: 34.6 %
HGB BLD-MCNC: 11.3 G/DL
IMM GRANULOCYTES # BLD AUTO: 0.07 X10(3) UL (ref 0–1)
IMM GRANULOCYTES NFR BLD: 1.2 %
LYMPHOCYTES # BLD AUTO: 1.35 X10(3) UL (ref 1–4)
LYMPHOCYTES NFR BLD AUTO: 22.7 %
MCH RBC QN AUTO: 28.4 PG (ref 26–34)
MCHC RBC AUTO-ENTMCNC: 32.7 G/DL (ref 31–37)
MCV RBC AUTO: 86.9 FL
MONOCYTES # BLD AUTO: 0.37 X10(3) UL (ref 0.1–1)
MONOCYTES NFR BLD AUTO: 6.2 %
NEUTROPHILS # BLD AUTO: 3.9 X10 (3) UL (ref 1.5–7.7)
NEUTROPHILS # BLD AUTO: 3.9 X10(3) UL (ref 1.5–7.7)
NEUTROPHILS NFR BLD AUTO: 65.7 %
OSMOLALITY SERPL CALC.SUM OF ELEC: 296 MOSM/KG (ref 275–295)
PLATELET # BLD AUTO: 218 10(3)UL (ref 150–450)
POTASSIUM SERPL-SCNC: 4 MMOL/L (ref 3.5–5.1)
PROT SERPL-MCNC: 7.1 G/DL (ref 6.4–8.2)
RBC # BLD AUTO: 3.98 X10(6)UL
SODIUM SERPL-SCNC: 138 MMOL/L (ref 136–145)
WBC # BLD AUTO: 5.9 X10(3) UL (ref 4–11)

## 2023-06-06 PROCEDURE — 99214 OFFICE O/P EST MOD 30 MIN: CPT | Performed by: SPECIALIST

## 2023-06-06 RX ORDER — POLYETHYLENE GLYCOL 3350 17 G/17G
17 POWDER, FOR SOLUTION ORAL DAILY
COMMUNITY

## 2023-06-06 NOTE — PROGRESS NOTES
Patient is here today for 3month follow up with Mary Wu for Chronic Myelocytic Leukemia. Patient denies pain. Fatigued. Current treatment Nilotinib 300mg BID. Medication list,medical history and toxicities were reviewed and updated. Education Record    Learner:  Patient     Disease / Diagnosis: Chronic Myelocytic Leukemia    Barriers / Limitations:  None   Comments:    Method:  Brief focused, Discussion, Printed material and Reinforcement   Comments:    General Topics:  Medication, Pain, Procedure and Plan of care reviewed   Comments:    Outcome:  Shows understanding   Comments:      AVS provided and follow up reviewed. Patient instructed to call as needed.

## 2023-06-12 LAB
E13A2 (B2A2) TANSCRIPT: 0.08 %
INTERPRETATION: POSITIVE

## 2023-06-13 ENCOUNTER — TELEPHONE (OUTPATIENT)
Dept: HEMATOLOGY/ONCOLOGY | Facility: HOSPITAL | Age: 73
End: 2023-06-13

## 2023-06-13 NOTE — TELEPHONE ENCOUNTER
Bhumi Lugo MD  [facility]  Let him know that his BCR/ABL result is lower than last time. Good result. We'll see him in 3 months. Pt informed.

## 2023-09-06 ENCOUNTER — TELEPHONE (OUTPATIENT)
Dept: HEMATOLOGY/ONCOLOGY | Facility: HOSPITAL | Age: 73
End: 2023-09-06

## 2023-09-06 ENCOUNTER — OFFICE VISIT (OUTPATIENT)
Dept: HEMATOLOGY/ONCOLOGY | Age: 73
End: 2023-09-06
Attending: SPECIALIST
Payer: MEDICARE

## 2023-09-06 VITALS
SYSTOLIC BLOOD PRESSURE: 155 MMHG | OXYGEN SATURATION: 94 % | DIASTOLIC BLOOD PRESSURE: 72 MMHG | BODY MASS INDEX: 37.62 KG/M2 | HEIGHT: 70.98 IN | TEMPERATURE: 98 F | HEART RATE: 81 BPM | RESPIRATION RATE: 18 BRPM | WEIGHT: 268.69 LBS

## 2023-09-06 DIAGNOSIS — C92.10 CML (CHRONIC MYELOCYTIC LEUKEMIA) (HCC): Primary | ICD-10-CM

## 2023-09-06 LAB
ALBUMIN SERPL-MCNC: 3.5 G/DL (ref 3.4–5)
ALBUMIN/GLOB SERPL: 1 {RATIO} (ref 1–2)
ALP LIVER SERPL-CCNC: 84 U/L
ALT SERPL-CCNC: 25 U/L
ANION GAP SERPL CALC-SCNC: 3 MMOL/L (ref 0–18)
AST SERPL-CCNC: 20 U/L (ref 15–37)
BASOPHILS # BLD AUTO: 0.02 X10(3) UL (ref 0–0.2)
BASOPHILS NFR BLD AUTO: 0.4 %
BILIRUB SERPL-MCNC: 0.2 MG/DL (ref 0.1–2)
BUN BLD-MCNC: 22 MG/DL (ref 7–18)
CALCIUM BLD-MCNC: 9.1 MG/DL (ref 8.5–10.1)
CHLORIDE SERPL-SCNC: 106 MMOL/L (ref 98–112)
CO2 SERPL-SCNC: 28 MMOL/L (ref 21–32)
CREAT BLD-MCNC: 1.24 MG/DL
EGFRCR SERPLBLD CKD-EPI 2021: 61 ML/MIN/1.73M2 (ref 60–?)
EOSINOPHIL # BLD AUTO: 0.13 X10(3) UL (ref 0–0.7)
EOSINOPHIL NFR BLD AUTO: 2.5 %
ERYTHROCYTE [DISTWIDTH] IN BLOOD BY AUTOMATED COUNT: 13.8 %
FASTING STATUS PATIENT QL REPORTED: NO
GLOBULIN PLAS-MCNC: 3.5 G/DL (ref 2.8–4.4)
GLUCOSE BLD-MCNC: 276 MG/DL (ref 70–99)
HCT VFR BLD AUTO: 34.9 %
HGB BLD-MCNC: 11.1 G/DL
IMM GRANULOCYTES # BLD AUTO: 0.05 X10(3) UL (ref 0–1)
IMM GRANULOCYTES NFR BLD: 1 %
LYMPHOCYTES # BLD AUTO: 1.14 X10(3) UL (ref 1–4)
LYMPHOCYTES NFR BLD AUTO: 21.8 %
MCH RBC QN AUTO: 27.2 PG (ref 26–34)
MCHC RBC AUTO-ENTMCNC: 31.8 G/DL (ref 31–37)
MCV RBC AUTO: 85.5 FL
MONOCYTES # BLD AUTO: 0.26 X10(3) UL (ref 0.1–1)
MONOCYTES NFR BLD AUTO: 5 %
NEUTROPHILS # BLD AUTO: 3.63 X10 (3) UL (ref 1.5–7.7)
NEUTROPHILS # BLD AUTO: 3.63 X10(3) UL (ref 1.5–7.7)
NEUTROPHILS NFR BLD AUTO: 69.3 %
OSMOLALITY SERPL CALC.SUM OF ELEC: 297 MOSM/KG (ref 275–295)
PLATELET # BLD AUTO: 202 10(3)UL (ref 150–450)
POTASSIUM SERPL-SCNC: 4 MMOL/L (ref 3.5–5.1)
PROT SERPL-MCNC: 7 G/DL (ref 6.4–8.2)
RBC # BLD AUTO: 4.08 X10(6)UL
SODIUM SERPL-SCNC: 137 MMOL/L (ref 136–145)
WBC # BLD AUTO: 5.2 X10(3) UL (ref 4–11)

## 2023-09-06 PROCEDURE — 99214 OFFICE O/P EST MOD 30 MIN: CPT | Performed by: SPECIALIST

## 2023-09-06 RX ORDER — LISINOPRIL 10 MG/1
10 TABLET ORAL DAILY
COMMUNITY
Start: 2023-07-17

## 2023-09-06 NOTE — PROGRESS NOTES
Patient is here today for follow up with Gillian Peoples for Chronic Myelocytic Leukemia - patient is on Nilotinib 300mg BID. Patient denies pain today. Stated is going to have dental procedures including extractions. Just completed antibiotic therapy. Also stated has had blood in his urine so he going to see urology - per his PCP office. Denies adverse side effects from Nilotinib therapy. Medication list,medical history and toxicities were reviewed and updated. Education Record    Learner:  Patient      Disease / Diagnosis: Chronic Myelocytic Leukemia    Barriers / Limitations:  None   Comments:    Method:  Brief focused, Discussion, Printed material and Reinforcement   Comments:    General Topics:  Medication, Pain, Procedure and Plan of care reviewed   Comments:    Outcome:  Shows understanding   Comments:      AVS provided and follow up reviewed. Patient instructed to call as needed.

## 2023-09-06 NOTE — TELEPHONE ENCOUNTER
MD Melissa Barajas, RN    Let him know that his blood sugar was 270. That's high even when not fasting. He should see his PCP about this soon. Patient notified. Repeated instruction. Stated he would call his Primary care doctor.

## 2023-09-07 ENCOUNTER — TELEPHONE (OUTPATIENT)
Dept: HEMATOLOGY/ONCOLOGY | Facility: HOSPITAL | Age: 73
End: 2023-09-07

## 2023-09-07 NOTE — TELEPHONE ENCOUNTER
Left detailed message on voicemail. Patient may have any antibiotic required to treat his dental issues. To return call with additional questions.     Jose D Redman RN

## 2023-09-07 NOTE — TELEPHONE ENCOUNTER
Dr King Staples office wanted to clarify what antibiotics the patient could be prescribed from his dentist.  Please call Melyssa Estes back. Thank you.

## 2023-09-12 ENCOUNTER — TELEPHONE (OUTPATIENT)
Dept: HEMATOLOGY/ONCOLOGY | Facility: HOSPITAL | Age: 73
End: 2023-09-12

## 2023-09-12 LAB
E13A2 (B2A2) TANSCRIPT: 0.11 %
INTERPRETATION: POSITIVE

## 2023-09-13 ENCOUNTER — TELEPHONE (OUTPATIENT)
Dept: HEMATOLOGY/ONCOLOGY | Facility: HOSPITAL | Age: 73
End: 2023-09-13

## 2023-09-13 NOTE — TELEPHONE ENCOUNTER
Patient called to check if Doctor received clearance form from [facility]? Please notify if he did or didn't.  Thanks

## 2023-09-28 ENCOUNTER — TELEPHONE (OUTPATIENT)
Dept: HEMATOLOGY/ONCOLOGY | Facility: HOSPITAL | Age: 73
End: 2023-09-28

## 2023-09-28 DIAGNOSIS — C92.10 CML (CHRONIC MYELOCYTIC LEUKEMIA) (HCC): ICD-10-CM

## 2023-09-28 NOTE — TELEPHONE ENCOUNTER
Patient calling says its tie for yearly re enrollment for medication. Asking if you can call back to further discuss.

## 2023-12-06 ENCOUNTER — SOCIAL WORK SERVICES (OUTPATIENT)
Dept: HEMATOLOGY/ONCOLOGY | Facility: HOSPITAL | Age: 73
End: 2023-12-06

## 2023-12-06 ENCOUNTER — OFFICE VISIT (OUTPATIENT)
Dept: HEMATOLOGY/ONCOLOGY | Age: 73
End: 2023-12-06
Attending: SPECIALIST
Payer: MEDICARE

## 2023-12-06 VITALS
DIASTOLIC BLOOD PRESSURE: 79 MMHG | HEIGHT: 70.98 IN | OXYGEN SATURATION: 96 % | HEART RATE: 82 BPM | TEMPERATURE: 98 F | RESPIRATION RATE: 18 BRPM | SYSTOLIC BLOOD PRESSURE: 150 MMHG | BODY MASS INDEX: 38.1 KG/M2 | WEIGHT: 272.13 LBS

## 2023-12-06 DIAGNOSIS — R73.9 HYPERGLYCEMIA: ICD-10-CM

## 2023-12-06 DIAGNOSIS — C92.10 CML (CHRONIC MYELOCYTIC LEUKEMIA) (HCC): Primary | ICD-10-CM

## 2023-12-06 LAB
ALBUMIN SERPL-MCNC: 3.5 G/DL (ref 3.4–5)
ALBUMIN/GLOB SERPL: 1 {RATIO} (ref 1–2)
ALP LIVER SERPL-CCNC: 80 U/L
ALT SERPL-CCNC: 24 U/L
ANION GAP SERPL CALC-SCNC: 6 MMOL/L (ref 0–18)
AST SERPL-CCNC: 24 U/L (ref 15–37)
BASOPHILS # BLD AUTO: 0.03 X10(3) UL (ref 0–0.2)
BASOPHILS NFR BLD AUTO: 0.6 %
BILIRUB SERPL-MCNC: 0.2 MG/DL (ref 0.1–2)
BUN BLD-MCNC: 27 MG/DL (ref 9–23)
CALCIUM BLD-MCNC: 8.9 MG/DL (ref 8.5–10.1)
CHLORIDE SERPL-SCNC: 106 MMOL/L (ref 98–112)
CO2 SERPL-SCNC: 26 MMOL/L (ref 21–32)
CREAT BLD-MCNC: 1.42 MG/DL
EGFRCR SERPLBLD CKD-EPI 2021: 52 ML/MIN/1.73M2 (ref 60–?)
EOSINOPHIL # BLD AUTO: 0.19 X10(3) UL (ref 0–0.7)
EOSINOPHIL NFR BLD AUTO: 3.8 %
ERYTHROCYTE [DISTWIDTH] IN BLOOD BY AUTOMATED COUNT: 14.3 %
GLOBULIN PLAS-MCNC: 3.6 G/DL (ref 2.8–4.4)
GLUCOSE BLD-MCNC: 213 MG/DL (ref 70–99)
HCT VFR BLD AUTO: 35.2 %
HGB BLD-MCNC: 11.5 G/DL
IMM GRANULOCYTES # BLD AUTO: 0.05 X10(3) UL (ref 0–1)
IMM GRANULOCYTES NFR BLD: 1 %
LYMPHOCYTES # BLD AUTO: 1.26 X10(3) UL (ref 1–4)
LYMPHOCYTES NFR BLD AUTO: 25.3 %
MCH RBC QN AUTO: 27.6 PG (ref 26–34)
MCHC RBC AUTO-ENTMCNC: 32.7 G/DL (ref 31–37)
MCV RBC AUTO: 84.4 FL
MONOCYTES # BLD AUTO: 0.34 X10(3) UL (ref 0.1–1)
MONOCYTES NFR BLD AUTO: 6.8 %
NEUTROPHILS # BLD AUTO: 3.12 X10 (3) UL (ref 1.5–7.7)
NEUTROPHILS # BLD AUTO: 3.12 X10(3) UL (ref 1.5–7.7)
NEUTROPHILS NFR BLD AUTO: 62.5 %
OSMOLALITY SERPL CALC.SUM OF ELEC: 297 MOSM/KG (ref 275–295)
PLATELET # BLD AUTO: 186 10(3)UL (ref 150–450)
POTASSIUM SERPL-SCNC: 4.2 MMOL/L (ref 3.5–5.1)
PROT SERPL-MCNC: 7.1 G/DL (ref 6.4–8.2)
RBC # BLD AUTO: 4.17 X10(6)UL
SODIUM SERPL-SCNC: 138 MMOL/L (ref 136–145)
WBC # BLD AUTO: 5 X10(3) UL (ref 4–11)

## 2023-12-06 PROCEDURE — 99214 OFFICE O/P EST MOD 30 MIN: CPT | Performed by: SPECIALIST

## 2023-12-06 RX ORDER — QUETIAPINE FUMARATE 200 MG/1
200 TABLET, FILM COATED ORAL 3 TIMES DAILY
COMMUNITY
Start: 2023-10-20

## 2023-12-06 RX ORDER — QUETIAPINE FUMARATE 50 MG/1
50 TABLET, FILM COATED ORAL 3 TIMES DAILY
COMMUNITY
Start: 2023-11-28

## 2023-12-06 NOTE — PROGRESS NOTES
Patient is here for MD f/u for CML. Patient continues on Nilotinib 300 mg bid. C/o joint stiffness. No diarrhea but has constipation on occasion. Appetite is good. No nausea or vomiting.      Education Record    Learner:  Patient    Disease / Diagnosis:  CML     Barriers / Limitations:  None   Comments:    Method:  Discussion   Comments:    General Topics:  Plan of care reviewed   Comments:    Outcome:  Shows understanding   Comments:

## 2023-12-06 NOTE — PROGRESS NOTES
SW met with pt per his request.  Pt had an application and SW assisted with obtaining information for billing. SW gave application to Armin Pitts LCSW   at [facility], [facility], [place], [address] [facility] [address] [place] [address] [place]  Ph: [phone]. [email or web address]  Fax: [phone]

## 2023-12-15 LAB
E13A2 (B2A2) TANSCRIPT: 0.15 %
INTERPRETATION: POSITIVE

## 2024-03-06 ENCOUNTER — OFFICE VISIT (OUTPATIENT)
Dept: HEMATOLOGY/ONCOLOGY | Age: 74
End: 2024-03-06
Attending: SPECIALIST
Payer: MEDICARE

## 2024-03-06 VITALS
OXYGEN SATURATION: 94 % | DIASTOLIC BLOOD PRESSURE: 70 MMHG | HEIGHT: 70.98 IN | RESPIRATION RATE: 20 BRPM | SYSTOLIC BLOOD PRESSURE: 124 MMHG | TEMPERATURE: 98 F | BODY MASS INDEX: 38.38 KG/M2 | HEART RATE: 97 BPM | WEIGHT: 274.13 LBS

## 2024-03-06 DIAGNOSIS — C92.10 CML (CHRONIC MYELOCYTIC LEUKEMIA) (HCC): Primary | ICD-10-CM

## 2024-03-06 LAB
ALBUMIN SERPL-MCNC: 3.6 G/DL (ref 3.4–5)
ALBUMIN/GLOB SERPL: 1.1 {RATIO} (ref 1–2)
ALP LIVER SERPL-CCNC: 73 U/L
ALT SERPL-CCNC: 22 U/L
ANION GAP SERPL CALC-SCNC: 3 MMOL/L (ref 0–18)
AST SERPL-CCNC: 23 U/L (ref 15–37)
BASOPHILS # BLD AUTO: 0.01 X10(3) UL (ref 0–0.2)
BASOPHILS NFR BLD AUTO: 0.2 %
BILIRUB SERPL-MCNC: 0.2 MG/DL (ref 0.1–2)
BUN BLD-MCNC: 29 MG/DL (ref 9–23)
CALCIUM BLD-MCNC: 9.3 MG/DL (ref 8.5–10.1)
CHLORIDE SERPL-SCNC: 105 MMOL/L (ref 98–112)
CO2 SERPL-SCNC: 27 MMOL/L (ref 21–32)
CREAT BLD-MCNC: 1.35 MG/DL
EGFRCR SERPLBLD CKD-EPI 2021: 55 ML/MIN/1.73M2 (ref 60–?)
EOSINOPHIL # BLD AUTO: 0.14 X10(3) UL (ref 0–0.7)
EOSINOPHIL NFR BLD AUTO: 2.3 %
ERYTHROCYTE [DISTWIDTH] IN BLOOD BY AUTOMATED COUNT: 14.9 %
GLOBULIN PLAS-MCNC: 3.4 G/DL (ref 2.8–4.4)
GLUCOSE BLD-MCNC: 129 MG/DL (ref 70–99)
HCT VFR BLD AUTO: 35.6 %
HGB BLD-MCNC: 11.3 G/DL
IMM GRANULOCYTES # BLD AUTO: 0.06 X10(3) UL (ref 0–1)
IMM GRANULOCYTES NFR BLD: 1 %
LYMPHOCYTES # BLD AUTO: 1.34 X10(3) UL (ref 1–4)
LYMPHOCYTES NFR BLD AUTO: 22.3 %
MCH RBC QN AUTO: 27.1 PG (ref 26–34)
MCHC RBC AUTO-ENTMCNC: 31.7 G/DL (ref 31–37)
MCV RBC AUTO: 85.4 FL
MONOCYTES # BLD AUTO: 0.32 X10(3) UL (ref 0.1–1)
MONOCYTES NFR BLD AUTO: 5.3 %
NEUTROPHILS # BLD AUTO: 4.15 X10 (3) UL (ref 1.5–7.7)
NEUTROPHILS # BLD AUTO: 4.15 X10(3) UL (ref 1.5–7.7)
NEUTROPHILS NFR BLD AUTO: 68.9 %
OSMOLALITY SERPL CALC.SUM OF ELEC: 288 MOSM/KG (ref 275–295)
PLATELET # BLD AUTO: 204 10(3)UL (ref 150–450)
POTASSIUM SERPL-SCNC: 4.4 MMOL/L (ref 3.5–5.1)
PROT SERPL-MCNC: 7 G/DL (ref 6.4–8.2)
RBC # BLD AUTO: 4.17 X10(6)UL
SODIUM SERPL-SCNC: 135 MMOL/L (ref 136–145)
WBC # BLD AUTO: 6 X10(3) UL (ref 4–11)

## 2024-03-06 PROCEDURE — 99214 OFFICE O/P EST MOD 30 MIN: CPT | Performed by: SPECIALIST

## 2024-03-06 RX ORDER — CARBAMAZEPINE 100 MG/1
100 TABLET, CHEWABLE ORAL 2 TIMES DAILY
COMMUNITY
Start: 2024-01-29

## 2024-03-06 NOTE — PROGRESS NOTES
[facility] Progress Note      Patient Name: Darryn Juarez   YOB: 1950  Medical Record Number: [id number]  Attending Physician: Gavin Cruz M.D.     The 21st Century Cures Act makes medical notes like these available to patients in the interest of transparency. Please be advised this is a medical document. Medical documents are intended to carry relevant information, facts as evident, and the clinical opinion of the practitioner. The medical note is intended as peer to peer communication and may appear blunt or direct. It is written in medical language and may contain abbreviations or verbiage that are unfamiliar.     Date of Visit: 3/6/2024       Chief Complaint  Chronic myelogenous leukemia - follow up.    Oncologic History  Darryn Juarez Sr. is a 73 year old male undergoing cardiac workup for coronary artery disease, dilated cardiomyopathy, and dyspnea on exertion who was found to have a markedly elevated white blood cell count on complete blood count. On 2017 complete blood count showed a WBC of 128 K/mcl with 40% neutrophils, 28% bands, 5% lymphocytes, 9% monocytes, 1% eosinophils, 3% basophils, 9 metamyelocytes, 4% myelocytes, 1% blasts; hemoglobin 9.5 g/dl with elevated MCV, and platelet count 117 K/mcl. 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 Pretreatment BCR/ABL was 0.58436 (ratio) and 41.7541%.    Patient was started on hydroxyurea while arrangements made for patient to receive nilotinib. At beginning of 2017 patient started nilotinib 300 mg bid. When white blood cell count fell below 80 K/mcl, hydroxyurea was discontinued.     History of Present Illness  Patient returns for follow up. He denies any new adverse effects to nilotinib. Patient is crying today as his daughter recently  for CNS lymphoma.     Performance Status   Karnofsky 50% - Requires frequent medical help and considerable assistance.    Past Medical History (historical data, reviewed by  physician)  Dilated cardiomyopathy with LVEF 52%; depression/anxiety; diabetes mellitus; hypertension; hyperlipidemia; GERD; coronary artery disease with evidence of MI on stress test 11/2016.    Past Surgical History (historical data, reviewed by physician)  Cholecystectomy; excisional cervical lymph node biopsy.     Family History (historical data, reviewed by physician)  Brother with sarcoma; daughter with CNS lymphoma.     Social History (historical data, reviewed by physician)  Previous tobacco user but quit 1997; denies alcohol use.       Current Medications    metFORMIN 500 MG Oral Tab Take 1 tablet (500 mg total) by mouth 2 (two) times daily. 1000 mg in the AM, 500 mg in the PM      carBAMazepine 100 MG Oral Chew Tab Chew 1 tablet (100 mg total) by mouth 2 (two) times daily.      QUEtiapine 200 MG Oral Tab Take 1 tablet (200 mg total) by mouth 3 (three) times daily. Total dose 250 mg      QUEtiapine 50 MG Oral Tab Take 1 tablet (50 mg total) by mouth 3 (three) times daily. Total dose 250 mg      Nilotinib HCl 150 MG Oral Cap Take 150 mg by mouth 2 (two) times daily. (Patient taking differently: Take 300 mg by mouth 2 (two) times daily.) 360 capsule 3    lisinopril 10 MG Oral Tab Take 1 tablet (10 mg total) by mouth daily.      Polyethylene Glycol 3350 (MIRALAX) 17 GM/SCOOP Oral Powder Take 17 g by mouth daily.      Fenofibrate 145 MG Oral Tab Take 1 tablet (145 mg total) by mouth daily.      allopurinol 300 MG Oral Tab Take 1 tablet (300 mg total) by mouth daily.      Pantoprazole Sodium 40 MG Oral Tab EC Take 1 tablet (40 mg total) by mouth 2 (two) times daily before meals.      folic acid 1 MG Oral Tab Take 1 tablet (1 mg total) by mouth daily.      Prazosin HCl 1 MG Oral Cap Take 1 capsule (1 mg total) by mouth 2 (two) times daily.      Prochlorperazine Maleate (COMPAZINE) 10 mg tablet Take 1 tablet (10 mg total) by mouth every 6 (six) hours as needed for Nausea. 30 tablet 3    Albuterol Sulfate   (90 Base) MCG/ACT Inhalation Aero Soln Inhale 2 puffs into the lungs every 6 (six) hours as needed for Wheezing. 1 Inhaler 0    carvedilol 3.125 MG Oral Tab Take 1 tablet (3.125 mg total) by mouth 2 (two) times daily with meals.      DULoxetine HCl 60 MG Oral Cap DR Particles Take 1 capsule (60 mg total) by mouth daily.      aspirin 81 MG Oral Chew Tab Chew 1 tablet (81 mg total) by mouth daily. Took 4 this a.m.      Ondansetron HCl (ZOFRAN) 8 MG tablet Take 1 tablet (8 mg total) by mouth every 8 (eight) hours as needed for Nausea. 30 tablet 3    multiple vitamin Oral Chew Tab Chew 1 tablet by mouth daily.       Allergies   Mr. Juarez is allergic to haloperidol, tamsulosin, and primidone.     Vital Signs   /70 (BP Location: Left arm, Patient Position: Sitting, Cuff Size: large)   Pulse 97   Temp 98.1 °F (36.7 °C)   Resp 20   Ht 1.803 m (5' 10.98\")   Wt 124.3 kg (274 lb 1.6 oz)   SpO2 94%   BMI 38.25 kg/m²     Physical Examination   Constitutional      Well developed, well nourished. Appears close to chronological age. No apparent distress.   Head                   Normocephalic and atraumatic.  Eyes                   Conjunctiva clear; sclera anicteric.  ENMT                 External nose normal; external ears normal.  Neck                   Supple.  Hematologic/Lymphatic No cervical, supraclavicular lymphadenopathy.  Respiratory          Normal effort; no respiratory distress; clear to auscultation bilaterally.   Cardiovascular     Regular rate and rhythm; normal S1S2.  Abdomen            Soft; nontender; no hepatomegaly; spleen tip not palpable.   Extremities          No lower extremity edema.  Neurologic           Motor and sensory grossly intact.      Laboratory   Recent Results (from the past 24 hour(s))   COMP METABOLIC PANEL [E]    Collection Time: 03/06/24  1:40 PM   Result Value Ref Range    Glucose 129 (H) 70 - 99 mg/dL    Sodium 135 (L) 136 - 145 mmol/L    Potassium 4.4 3.5 - 5.1 mmol/L     Chloride 105 98 - 112 mmol/L    CO2 27.0 21.0 - 32.0 mmol/L    Anion Gap 3 0 - 18 mmol/L    BUN 29 (H) 9 - 23 mg/dL    Creatinine 1.35 (H) 0.70 - 1.30 mg/dL    Calcium, Total 9.3 8.5 - 10.1 mg/dL    Calculated Osmolality 288 275 - 295 mOsm/kg    eGFR-Cr 55 (L) >=60 mL/min/1.73m2    AST 23 15 - 37 U/L    ALT 22 16 - 61 U/L    Alkaline Phosphatase 73 45 - 117 U/L    Bilirubin, Total 0.2 0.1 - 2.0 mg/dL    Total Protein 7.0 6.4 - 8.2 g/dL    Albumin 3.6 3.4 - 5.0 g/dL    Globulin  3.4 2.8 - 4.4 g/dL    A/G Ratio 1.1 1.0 - 2.0    Patient Fasting for CMP? Patient not present    CBC W/ DIFFERENTIAL    Collection Time: 03/06/24  1:40 PM   Result Value Ref Range    WBC 6.0 4.0 - 11.0 x10(3) uL    RBC 4.17 3.80 - 5.80 x10(6)uL    HGB 11.3 (L) 13.0 - 17.5 g/dL    HCT 35.6 (L) 39.0 - 53.0 %    .0 150.0 - 450.0 10(3)uL    MCV 85.4 80.0 - 100.0 fL    MCH 27.1 26.0 - 34.0 pg    MCHC 31.7 31.0 - 37.0 g/dL    RDW 14.9 %    Neutrophil Absolute Prelim 4.15 1.50 - 7.70 x10 (3) uL    Neutrophil Absolute 4.15 1.50 - 7.70 x10(3) uL    Lymphocyte Absolute 1.34 1.00 - 4.00 x10(3) uL    Monocyte Absolute 0.32 0.10 - 1.00 x10(3) uL    Eosinophil Absolute 0.14 0.00 - 0.70 x10(3) uL    Basophil Absolute 0.01 0.00 - 0.20 x10(3) uL    Immature Granulocyte Absolute 0.06 0.00 - 1.00 x10(3) uL    Neutrophil % 68.9 %    Lymphocyte % 22.3 %    Monocyte % 5.3 %    Eosinophil % 2.3 %    Basophil % 0.2 %    Immature Granulocyte % 1.0 %     Impression and Plan   1.   Chronic myelogenous leukemia: Tolerating nivolumab well. Await results of PCR for BCR/ABL. If stable, there will be no recommended changes to his therapy.    Planned Follow Up   Patient will return for follow up in 3 months; he will be contacted with his PCR results.    Electronically signed by:    Gavin Cruz M.D.  System Medical Director of Oncology Services  [facility]

## 2024-03-06 NOTE — PROGRESS NOTES
Patient is here for MD f/u for CML. Patient is on Nilotinib 300 mg bid. Tolerating well. No GI concerns. Appetite is good. No fevers, night sweats or chills.     Education Record    Learner:  Patient    Disease / Diagnosis:  CML    Barriers / Limitations:  None   Comments:    Method:  Discussion   Comments:    General Topics:  Plan of care reviewed   Comments:    Outcome:  Shows understanding   Comments:

## 2024-03-08 LAB
BCR-ABL1 INTERPRETATION: DETECTED
NS MOLECULAR RESPONSE VALUE: 3.62

## 2024-03-11 ENCOUNTER — TELEPHONE (OUTPATIENT)
Dept: HEMATOLOGY/ONCOLOGY | Facility: HOSPITAL | Age: 74
End: 2024-03-11

## 2024-03-11 NOTE — TELEPHONE ENCOUNTER
Called patient to notify him that his BCR is good and that he is in remission, per Dr Cruz. Patient conveyed understanding.

## 2024-03-11 NOTE — TELEPHONE ENCOUNTER
----- Message from Gavin Cruz MD sent at 3/8/2024  4:53 PM CST -----  Call patient and let him know that his BCR is excellent. He remains in a remission.

## 2024-03-11 NOTE — TELEPHONE ENCOUNTER
Left voicemail to have patient return call to discuss lab results, per Dr Cruz. Awaiting call back.

## 2024-06-05 ENCOUNTER — OFFICE VISIT (OUTPATIENT)
Dept: HEMATOLOGY/ONCOLOGY | Age: 74
End: 2024-06-05
Attending: SPECIALIST
Payer: MEDICARE

## 2024-06-05 VITALS
TEMPERATURE: 99 F | BODY MASS INDEX: 38.25 KG/M2 | HEART RATE: 84 BPM | DIASTOLIC BLOOD PRESSURE: 69 MMHG | OXYGEN SATURATION: 94 % | WEIGHT: 273.19 LBS | SYSTOLIC BLOOD PRESSURE: 150 MMHG | RESPIRATION RATE: 18 BRPM | HEIGHT: 70.98 IN

## 2024-06-05 DIAGNOSIS — C92.10 CML (CHRONIC MYELOCYTIC LEUKEMIA) (HCC): Primary | ICD-10-CM

## 2024-06-05 LAB
ALBUMIN SERPL-MCNC: 3.6 G/DL (ref 3.4–5)
ALBUMIN/GLOB SERPL: 1 {RATIO} (ref 1–2)
ALP LIVER SERPL-CCNC: 71 U/L
ALT SERPL-CCNC: 28 U/L
ANION GAP SERPL CALC-SCNC: 4 MMOL/L (ref 0–18)
AST SERPL-CCNC: 25 U/L (ref 15–37)
BASOPHILS # BLD AUTO: 0.03 X10(3) UL (ref 0–0.2)
BASOPHILS NFR BLD AUTO: 0.5 %
BILIRUB SERPL-MCNC: 0.2 MG/DL (ref 0.1–2)
BUN BLD-MCNC: 23 MG/DL (ref 9–23)
CALCIUM BLD-MCNC: 9.6 MG/DL (ref 8.5–10.1)
CHLORIDE SERPL-SCNC: 106 MMOL/L (ref 98–112)
CO2 SERPL-SCNC: 26 MMOL/L (ref 21–32)
CREAT BLD-MCNC: 1.39 MG/DL
EGFRCR SERPLBLD CKD-EPI 2021: 53 ML/MIN/1.73M2 (ref 60–?)
EOSINOPHIL # BLD AUTO: 0.21 X10(3) UL (ref 0–0.7)
EOSINOPHIL NFR BLD AUTO: 3.5 %
ERYTHROCYTE [DISTWIDTH] IN BLOOD BY AUTOMATED COUNT: 14.7 %
FASTING STATUS PATIENT QL REPORTED: NO
GLOBULIN PLAS-MCNC: 3.5 G/DL (ref 2.8–4.4)
GLUCOSE BLD-MCNC: 130 MG/DL (ref 70–99)
HCT VFR BLD AUTO: 36 %
HGB BLD-MCNC: 11.5 G/DL
IMM GRANULOCYTES # BLD AUTO: 0.05 X10(3) UL (ref 0–1)
IMM GRANULOCYTES NFR BLD: 0.8 %
LYMPHOCYTES # BLD AUTO: 1.32 X10(3) UL (ref 1–4)
LYMPHOCYTES NFR BLD AUTO: 22.3 %
MCH RBC QN AUTO: 27.5 PG (ref 26–34)
MCHC RBC AUTO-ENTMCNC: 31.9 G/DL (ref 31–37)
MCV RBC AUTO: 86.1 FL
MONOCYTES # BLD AUTO: 0.39 X10(3) UL (ref 0.1–1)
MONOCYTES NFR BLD AUTO: 6.6 %
NEUTROPHILS # BLD AUTO: 3.93 X10 (3) UL (ref 1.5–7.7)
NEUTROPHILS # BLD AUTO: 3.93 X10(3) UL (ref 1.5–7.7)
NEUTROPHILS NFR BLD AUTO: 66.3 %
OSMOLALITY SERPL CALC.SUM OF ELEC: 287 MOSM/KG (ref 275–295)
PLATELET # BLD AUTO: 203 10(3)UL (ref 150–450)
POTASSIUM SERPL-SCNC: 4.1 MMOL/L (ref 3.5–5.1)
PROT SERPL-MCNC: 7.1 G/DL (ref 6.4–8.2)
RBC # BLD AUTO: 4.18 X10(6)UL
SODIUM SERPL-SCNC: 136 MMOL/L (ref 136–145)
WBC # BLD AUTO: 5.9 X10(3) UL (ref 4–11)

## 2024-06-05 PROCEDURE — 99214 OFFICE O/P EST MOD 30 MIN: CPT | Performed by: SPECIALIST

## 2024-06-05 PROCEDURE — G2211 COMPLEX E/M VISIT ADD ON: HCPCS | Performed by: SPECIALIST

## 2024-06-05 NOTE — PROGRESS NOTES
Patient is here for MD f/u for CML. Patient continues on Nilotinib 300 mg BID. Patient denies any side effects. Labs drawn today.       Education Record    Learner:  Patient    Disease / Diagnosis:  CML    Barriers / Limitations:  None   Comments:    Method:  Discussion   Comments:    General Topics:  Plan of care reviewed   Comments:    Outcome:  Shows understanding   Comments:

## 2024-06-05 NOTE — PROGRESS NOTES
[facility] Progress Note      Patient Name: Darryn Juarez   YOB: 1950  Medical Record Number: [id number]  Attending Physician: Gavin Cruz M.D.     The 21st Century Cures Act makes medical notes like these available to patients in the interest of transparency. Please be advised this is a medical document. Medical documents are intended to carry relevant information, facts as evident, and the clinical opinion of the practitioner. The medical note is intended as peer to peer communication and may appear blunt or direct. It is written in medical language and may contain abbreviations or verbiage that are unfamiliar.     Date of Visit: 6/5/2024       Chief Complaint  Chronic myelogenous leukemia - follow up.    Oncologic History  Darryn Juarez Sr. is a 74 year old male undergoing cardiac workup for coronary artery disease, dilated cardiomyopathy, and dyspnea on exertion who was found to have a markedly elevated white blood cell count on complete blood count. On 07/21/2017 complete blood count showed a WBC of 128 K/mcl with 40% neutrophils, 28% bands, 5% lymphocytes, 9% monocytes, 1% eosinophils, 3% basophils, 9 metamyelocytes, 4% myelocytes, 1% blasts; hemoglobin 9.5 g/dl with elevated MCV, and platelet count 117 K/mcl. 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 Pretreatment BCR/ABL was 0.49107 (ratio) and 41.7541%.    Patient was started on hydroxyurea while arrangements made for patient to receive nilotinib. At beginning of 09/2017 patient started nilotinib 300 mg bid. When white blood cell count fell below 80 K/mcl, hydroxyurea was discontinued.     History of Present Illness  Patient returns for follow up. He denies any new adverse effects to nilotinib.     Performance Status   Karnofsky 50% - Requires frequent medical help and considerable assistance.    Past Medical History (historical data, reviewed by physician)  Dilated cardiomyopathy with LVEF 52%; depression/anxiety; diabetes  mellitus; hypertension; hyperlipidemia; GERD; coronary artery disease with evidence of MI on stress test 11/2016.    Past Surgical History (historical data, reviewed by physician)  Cholecystectomy; excisional cervical lymph node biopsy.     Family History (historical data, reviewed by physician)  Brother with sarcoma; daughter with CNS lymphoma.     Social History (historical data, reviewed by physician)  Previous tobacco user but quit 1997; denies alcohol use.       Current Medications    metFORMIN 500 MG Oral Tab Take 1 tablet (500 mg total) by mouth 2 (two) times daily. 1000 mg in the AM, 500 mg in the PM      carBAMazepine 100 MG Oral Chew Tab Chew 1 tablet (100 mg total) by mouth 2 (two) times daily.      QUEtiapine 200 MG Oral Tab Take 1 tablet (200 mg total) by mouth 3 (three) times daily. Total dose 250 mg      QUEtiapine 50 MG Oral Tab Take 1 tablet (50 mg total) by mouth 3 (three) times daily. Total dose 250 mg      Nilotinib HCl 150 MG Oral Cap Take 150 mg by mouth 2 (two) times daily. (Patient taking differently: Take 300 mg by mouth 2 (two) times daily.) 360 capsule 3    lisinopril 10 MG Oral Tab Take 1 tablet (10 mg total) by mouth daily.      Polyethylene Glycol 3350 (MIRALAX) 17 GM/SCOOP Oral Powder Take 17 g by mouth daily.      Fenofibrate 145 MG Oral Tab Take 1 tablet (145 mg total) by mouth daily.      allopurinol 300 MG Oral Tab Take 1 tablet (300 mg total) by mouth daily.      Pantoprazole Sodium 40 MG Oral Tab EC Take 1 tablet (40 mg total) by mouth 2 (two) times daily before meals.      folic acid 1 MG Oral Tab Take 1 tablet (1 mg total) by mouth daily.      Prazosin HCl 1 MG Oral Cap Take 1 capsule (1 mg total) by mouth 2 (two) times daily.      Prochlorperazine Maleate (COMPAZINE) 10 mg tablet Take 1 tablet (10 mg total) by mouth every 6 (six) hours as needed for Nausea. 30 tablet 3    Albuterol Sulfate  (90 Base) MCG/ACT Inhalation Aero Soln Inhale 2 puffs into the lungs every 6  (six) hours as needed for Wheezing. 1 Inhaler 0    carvedilol 3.125 MG Oral Tab Take 1 tablet (3.125 mg total) by mouth 2 (two) times daily with meals.      DULoxetine HCl 60 MG Oral Cap DR Particles Take 1 capsule (60 mg total) by mouth daily.      aspirin 81 MG Oral Chew Tab Chew 1 tablet (81 mg total) by mouth daily. Took 4 this a.m.      Ondansetron HCl (ZOFRAN) 8 MG tablet Take 1 tablet (8 mg total) by mouth every 8 (eight) hours as needed for Nausea. 30 tablet 3    multiple vitamin Oral Chew Tab Chew 1 tablet by mouth daily.       Allergies   Mr. Juarez is allergic to haloperidol, tamsulosin, and primidone.     Vital Signs   /69 (BP Location: Left arm, Patient Position: Sitting, Cuff Size: large)   Pulse 84   Temp 98.7 °F (37.1 °C) (Tympanic)   Resp 18   Ht 1.803 m (5' 10.98\")   Wt 123.9 kg (273 lb 3.2 oz)   SpO2 94%   BMI 38.12 kg/m²     Physical Examination   Constitutional      Well developed, well nourished. Appears close to chronological age. No apparent distress.   Head                   Normocephalic and atraumatic.  Eyes                   Conjunctiva clear; sclera anicteric.  ENMT                 External nose normal; external ears normal.  Neck                   Supple.  Hematologic/Lymphatic No cervical, supraclavicular lymphadenopathy.  Respiratory          Normal effort; no respiratory distress; clear to auscultation bilaterally.   Cardiovascular     Regular rate and rhythm; normal S1S2.  Abdomen            Soft; nontender; no hepatomegaly; spleen tip not palpable.   Extremities          No lower extremity edema.  Neurologic           Motor and sensory grossly intact.      Laboratory   Recent Results (from the past 24 hour(s))   COMP METABOLIC PANEL [E]    Collection Time: 06/05/24  1:54 PM   Result Value Ref Range    Glucose 130 (H) 70 - 99 mg/dL    Sodium 136 136 - 145 mmol/L    Potassium 4.1 3.5 - 5.1 mmol/L    Chloride 106 98 - 112 mmol/L    CO2 26.0 21.0 - 32.0 mmol/L    Anion Gap  4 0 - 18 mmol/L    BUN 23 9 - 23 mg/dL    Creatinine 1.39 (H) 0.70 - 1.30 mg/dL    Calcium, Total 9.6 8.5 - 10.1 mg/dL    Calculated Osmolality 287 275 - 295 mOsm/kg    eGFR-Cr 53 (L) >=60 mL/min/1.73m2    AST 25 15 - 37 U/L    ALT 28 16 - 61 U/L    Alkaline Phosphatase 71 45 - 117 U/L    Bilirubin, Total 0.2 0.1 - 2.0 mg/dL    Total Protein 7.1 6.4 - 8.2 g/dL    Albumin 3.6 3.4 - 5.0 g/dL    Globulin  3.5 2.8 - 4.4 g/dL    A/G Ratio 1.0 1.0 - 2.0    Patient Fasting for CMP? No    CBC W/ DIFFERENTIAL    Collection Time: 06/05/24  1:54 PM   Result Value Ref Range    WBC 5.9 4.0 - 11.0 x10(3) uL    RBC 4.18 3.80 - 5.80 x10(6)uL    HGB 11.5 (L) 13.0 - 17.5 g/dL    HCT 36.0 (L) 39.0 - 53.0 %    .0 150.0 - 450.0 10(3)uL    MCV 86.1 80.0 - 100.0 fL    MCH 27.5 26.0 - 34.0 pg    MCHC 31.9 31.0 - 37.0 g/dL    RDW 14.7 %    Neutrophil Absolute Prelim 3.93 1.50 - 7.70 x10 (3) uL    Neutrophil Absolute 3.93 1.50 - 7.70 x10(3) uL    Lymphocyte Absolute 1.32 1.00 - 4.00 x10(3) uL    Monocyte Absolute 0.39 0.10 - 1.00 x10(3) uL    Eosinophil Absolute 0.21 0.00 - 0.70 x10(3) uL    Basophil Absolute 0.03 0.00 - 0.20 x10(3) uL    Immature Granulocyte Absolute 0.05 0.00 - 1.00 x10(3) uL    Neutrophil % 66.3 %    Lymphocyte % 22.3 %    Monocyte % 6.6 %    Eosinophil % 3.5 %    Basophil % 0.5 %    Immature Granulocyte % 0.8 %     Impression and Plan   1.   Chronic myelogenous leukemia: Tolerating nivolumab well. Await results of PCR for BCR/ABL. If stable, there will be no recommended changes to his therapy.    Patient will continue to receive longitudinal care by me for the complex care required for the cancer diagnosis including the expected complications related to anticancer therapy.     Planned Follow Up   Patient will return for follow up in 3 months; he will be contacted with his PCR results.    Electronically signed by:    Gavin Cruz M.D.  System Medical Director of Oncology Services  [facility]

## 2024-06-07 LAB
BCR-ABL1 INTERPRETATION: DETECTED
NS MOLECULAR RESPONSE VALUE: 3.59

## 2024-06-10 ENCOUNTER — TELEPHONE (OUTPATIENT)
Dept: HEMATOLOGY/ONCOLOGY | Facility: HOSPITAL | Age: 74
End: 2024-06-10

## 2024-06-10 NOTE — TELEPHONE ENCOUNTER
Test(s) completed: BCR/ABL    Results: per Dr Cruz \"Let him know BCR/ABL is 0.026. Excellent result as anything less than 0.1 is good.\"    Plan: patient notified

## 2024-07-25 ENCOUNTER — TELEPHONE (OUTPATIENT)
Dept: HEMATOLOGY/ONCOLOGY | Facility: HOSPITAL | Age: 74
End: 2024-07-25

## 2024-07-25 NOTE — TELEPHONE ENCOUNTER
Phoned patient to notify him his shipment of Tasigna was delivered to the cancer enter instead of his home. Asked patient to pick them up at the Cancer center at his convenience. Patient verbalized understanding.

## 2024-09-03 NOTE — PROGRESS NOTES
[facility] Progress Note      Patient Name: Darryn Juarez   YOB: 1950  Medical Record Number: [id number]  Attending Physician: Gavin Cruz M.D.     The 21st Century Cures Act makes medical notes like these available to patients in the interest of transparency. Please be advised this is a medical document. Medical documents are intended to carry relevant information, facts as evident, and the clinical opinion of the practitioner. The medical note is intended as peer to peer communication and may appear blunt or direct. It is written in medical language and may contain abbreviations or verbiage that are unfamiliar.     Date of Visit: 9/4/2024       Chief Complaint  Chronic myelogenous leukemia - follow up.    Oncologic History  Darryn Juarez Sr. is a 74 year old male undergoing cardiac workup for coronary artery disease, dilated cardiomyopathy, and dyspnea on exertion who was found to have a markedly elevated white blood cell count on complete blood count. On 07/21/2017 complete blood count showed a WBC of 128 K/mcl with 40% neutrophils, 28% bands, 5% lymphocytes, 9% monocytes, 1% eosinophils, 3% basophils, 9 metamyelocytes, 4% myelocytes, 1% blasts; hemoglobin 9.5 g/dl with elevated MCV, and platelet count 117 K/mcl. 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 Pretreatment BCR/ABL was 0.45215 (ratio) and 41.7541%.    Patient was started on hydroxyurea while arrangements made for patient to receive nilotinib. At beginning of 09/2017 patient started nilotinib 300 mg bid. When white blood cell count fell below 80 K/mcl, hydroxyurea was discontinued.     History of Present Illness  Patient returns for follow up. He denies any new adverse effects to nilotinib.     Performance Status   Karnofsky 70% - Unable to do active work, but able to care for self.    Past Medical History (historical data, reviewed by physician)  Dilated cardiomyopathy with LVEF 52%; depression/anxiety; diabetes mellitus;  hypertension; hyperlipidemia; GERD; coronary artery disease with evidence of MI on stress test 11/2016.    Past Surgical History (historical data, reviewed by physician)  Cholecystectomy; excisional cervical lymph node biopsy.     Family History (historical data, reviewed by physician)  Brother with sarcoma; daughter with CNS lymphoma.     Social History (historical data, reviewed by physician)  Previous tobacco user but quit 1997; denies alcohol use.       Current Medications    glimepiride 2 MG Oral Tab Take 1 tablet (2 mg total) by mouth every morning.      metFORMIN HCl 1000 MG Oral Tab Take 1 tablet (1,000 mg total) by mouth 2 (two) times daily.      carBAMazepine 100 MG Oral Chew Tab Chew 1 tablet (100 mg total) by mouth 2 (two) times daily.      QUEtiapine 200 MG Oral Tab Take 1 tablet (200 mg total) by mouth 3 (three) times daily. Total dose 250 mg      QUEtiapine 50 MG Oral Tab Take 1 tablet (50 mg total) by mouth 3 (three) times daily. Total dose 250 mg      Nilotinib HCl 150 MG Oral Cap Take 150 mg by mouth 2 (two) times daily. (Patient taking differently: Take 300 mg by mouth 2 (two) times daily.) 360 capsule 3    Polyethylene Glycol 3350 (MIRALAX) 17 GM/SCOOP Oral Powder Take 17 g by mouth daily.      Fenofibrate 145 MG Oral Tab Take 1 tablet (145 mg total) by mouth daily.      allopurinol 300 MG Oral Tab Take 1 tablet (300 mg total) by mouth daily.      Pantoprazole Sodium 40 MG Oral Tab EC Take 1 tablet (40 mg total) by mouth 2 (two) times daily before meals.      folic acid 1 MG Oral Tab Take 1 tablet (1 mg total) by mouth daily.      Prazosin HCl 1 MG Oral Cap Take 1 capsule (1 mg total) by mouth 2 (two) times daily.      Prochlorperazine Maleate (COMPAZINE) 10 mg tablet Take 1 tablet (10 mg total) by mouth every 6 (six) hours as needed for Nausea. 30 tablet 3    Albuterol Sulfate  (90 Base) MCG/ACT Inhalation Aero Soln Inhale 2 puffs into the lungs every 6 (six) hours as needed for Wheezing.  1 Inhaler 0    DULoxetine HCl 60 MG Oral Cap DR Particles Take 1 capsule (60 mg total) by mouth daily.      aspirin 81 MG Oral Chew Tab Chew 1 tablet (81 mg total) by mouth daily. Took 4 this a.m.      Ondansetron HCl (ZOFRAN) 8 MG tablet Take 1 tablet (8 mg total) by mouth every 8 (eight) hours as needed for Nausea. 30 tablet 3    multiple vitamin Oral Chew Tab Chew 1 tablet by mouth daily.       Allergies   Mr. Juarez is allergic to haloperidol, tamsulosin, and primidone.     Vital Signs   /70 (BP Location: Left arm, Patient Position: Sitting, Cuff Size: large)   Pulse 84   Temp 98 °F (36.7 °C) (Tympanic)   Resp 18   Ht 1.803 m (5' 10.98\")   Wt 125 kg (275 lb 8 oz)   SpO2 94%   BMI 38.44 kg/m²     Physical Examination   Constitutional      Well developed, well nourished. Appears close to chronological age. No apparent distress.   Head                   Normocephalic and atraumatic.  Eyes                   Conjunctiva clear; sclera anicteric.  ENMT                 External nose normal; external ears normal.  Neck                   Supple.  Hematologic/Lymphatic No cervical, supraclavicular lymphadenopathy.  Respiratory          Normal effort; no respiratory distress; clear to auscultation bilaterally.   Cardiovascular     Regular rate and rhythm; normal S1S2.  Abdomen            Soft; nontender; no hepatomegaly; spleen tip not palpable.   Extremities          No lower extremity edema.  Neurologic           Motor and sensory grossly intact.    Laboratory   Recent Results (from the past 24 hour(s))   CBC W/DIFF [E]    Collection Time: 09/04/24  1:54 PM   Result Value Ref Range    WBC 6.0 4.0 - 11.0 x10(3) uL    RBC 4.22 3.80 - 5.80 x10(6)uL    HGB 11.7 (L) 13.0 - 17.5 g/dL    HCT 36.4 (L) 39.0 - 53.0 %    .0 150.0 - 450.0 10(3)uL    MCV 86.3 80.0 - 100.0 fL    MCH 27.7 26.0 - 34.0 pg    MCHC 32.1 31.0 - 37.0 g/dL    RDW 14.6 %    Neutrophil Absolute Prelim 4.14 1.50 - 7.70 x10 (3) uL    Neutrophil  Absolute 4.14 1.50 - 7.70 x10(3) uL    Lymphocyte Absolute 1.19 1.00 - 4.00 x10(3) uL    Monocyte Absolute 0.35 0.10 - 1.00 x10(3) uL    Eosinophil Absolute 0.20 0.00 - 0.70 x10(3) uL    Basophil Absolute 0.03 0.00 - 0.20 x10(3) uL    Immature Granulocyte Absolute 0.06 0.00 - 1.00 x10(3) uL    Neutrophil % 69.3 %    Lymphocyte % 19.9 %    Monocyte % 5.9 %    Eosinophil % 3.4 %    Basophil % 0.5 %    Immature Granulocyte % 1.0 %   COMP METABOLIC PANEL [E]    Collection Time: 09/04/24  1:54 PM   Result Value Ref Range    Glucose 149 (H) 70 - 99 mg/dL    Sodium 138 136 - 145 mmol/L    Potassium 4.4 3.5 - 5.1 mmol/L    Chloride 106 98 - 112 mmol/L    CO2 27.0 21.0 - 32.0 mmol/L    Anion Gap 5 0 - 18 mmol/L    BUN 26 (H) 9 - 23 mg/dL    Creatinine 1.30 0.70 - 1.30 mg/dL    Calcium, Total 9.5 8.5 - 10.1 mg/dL    Calculated Osmolality 294 275 - 295 mOsm/kg    eGFR-Cr 57 (L) >=60 mL/min/1.73m2    AST 29 15 - 37 U/L    ALT 31 16 - 61 U/L    Alkaline Phosphatase 74 45 - 117 U/L    Bilirubin, Total 0.2 0.1 - 2.0 mg/dL    Total Protein 7.0 6.4 - 8.2 g/dL    Albumin 3.5 3.4 - 5.0 g/dL    Globulin  3.5 2.8 - 4.4 g/dL    A/G Ratio 1.0 1.0 - 2.0    Patient Fasting for CMP? No    CBC w/Auto Diff    Collection Time: 09/04/24  1:54 PM   Result Value Ref Range    WBC 5.9 4.0 - 11.0 x10(3) uL    RBC 4.25 3.80 - 5.80 x10(6)uL    HGB 11.6 (L) 13.0 - 17.5 g/dL    HCT 36.4 (L) 39.0 - 53.0 %    .0 150.0 - 450.0 10(3)uL    MCV 85.6 80.0 - 100.0 fL    MCH 27.3 26.0 - 34.0 pg    MCHC 31.9 31.0 - 37.0 g/dL    RDW 14.6 %    Neutrophil Absolute Prelim 4.07 1.50 - 7.70 x10 (3) uL    Neutrophil Absolute 4.07 1.50 - 7.70 x10(3) uL    Lymphocyte Absolute 1.17 1.00 - 4.00 x10(3) uL    Monocyte Absolute 0.39 0.10 - 1.00 x10(3) uL    Eosinophil Absolute 0.21 0.00 - 0.70 x10(3) uL    Basophil Absolute 0.02 0.00 - 0.20 x10(3) uL    Immature Granulocyte Absolute 0.06 0.00 - 1.00 x10(3) uL    Neutrophil % 68.8 %    Lymphocyte % 19.8 %    Monocyte %  6.6 %    Eosinophil % 3.5 %    Basophil % 0.3 %    Immature Granulocyte % 1.0 %     Impression and Plan   1.   Chronic myelogenous leukemia: Tolerating nivolumab well. Await results of PCR for BCR/ABL. If stable, there will be no recommended changes to his therapy.    Patient will continue to receive longitudinal care by me for the complex care required for the cancer diagnosis including the expected complications related to anticancer therapy.     Planned Follow Up   Patient will return for follow up in 3 months; he will be contacted with his PCR results.    Electronically signed by:    Gavin Cruz M.D.  System Medical Director of Oncology Services  [facility]

## 2024-09-04 ENCOUNTER — OFFICE VISIT (OUTPATIENT)
Dept: HEMATOLOGY/ONCOLOGY | Age: 74
End: 2024-09-04
Attending: SPECIALIST
Payer: MEDICARE

## 2024-09-04 VITALS
WEIGHT: 275.5 LBS | RESPIRATION RATE: 18 BRPM | DIASTOLIC BLOOD PRESSURE: 70 MMHG | OXYGEN SATURATION: 94 % | SYSTOLIC BLOOD PRESSURE: 156 MMHG | HEART RATE: 84 BPM | TEMPERATURE: 98 F | BODY MASS INDEX: 38.57 KG/M2 | HEIGHT: 70.98 IN

## 2024-09-04 DIAGNOSIS — C92.10 CML (CHRONIC MYELOCYTIC LEUKEMIA) (HCC): Primary | ICD-10-CM

## 2024-09-04 LAB
ALBUMIN SERPL-MCNC: 3.5 G/DL (ref 3.4–5)
ALBUMIN/GLOB SERPL: 1 {RATIO} (ref 1–2)
ALP LIVER SERPL-CCNC: 74 U/L
ALT SERPL-CCNC: 31 U/L
ANION GAP SERPL CALC-SCNC: 5 MMOL/L (ref 0–18)
AST SERPL-CCNC: 29 U/L (ref 15–37)
BASOPHILS # BLD AUTO: 0.02 X10(3) UL (ref 0–0.2)
BASOPHILS # BLD AUTO: 0.03 X10(3) UL (ref 0–0.2)
BASOPHILS NFR BLD AUTO: 0.3 %
BASOPHILS NFR BLD AUTO: 0.5 %
BILIRUB SERPL-MCNC: 0.2 MG/DL (ref 0.1–2)
BUN BLD-MCNC: 26 MG/DL (ref 9–23)
CALCIUM BLD-MCNC: 9.5 MG/DL (ref 8.5–10.1)
CHLORIDE SERPL-SCNC: 106 MMOL/L (ref 98–112)
CO2 SERPL-SCNC: 27 MMOL/L (ref 21–32)
CREAT BLD-MCNC: 1.3 MG/DL
EGFRCR SERPLBLD CKD-EPI 2021: 57 ML/MIN/1.73M2 (ref 60–?)
EOSINOPHIL # BLD AUTO: 0.2 X10(3) UL (ref 0–0.7)
EOSINOPHIL # BLD AUTO: 0.21 X10(3) UL (ref 0–0.7)
EOSINOPHIL NFR BLD AUTO: 3.4 %
EOSINOPHIL NFR BLD AUTO: 3.5 %
ERYTHROCYTE [DISTWIDTH] IN BLOOD BY AUTOMATED COUNT: 14.6 %
ERYTHROCYTE [DISTWIDTH] IN BLOOD BY AUTOMATED COUNT: 14.6 %
FASTING STATUS PATIENT QL REPORTED: NO
GLOBULIN PLAS-MCNC: 3.5 G/DL (ref 2.8–4.4)
GLUCOSE BLD-MCNC: 149 MG/DL (ref 70–99)
HCT VFR BLD AUTO: 36.4 %
HCT VFR BLD AUTO: 36.4 %
HGB BLD-MCNC: 11.6 G/DL
HGB BLD-MCNC: 11.7 G/DL
IMM GRANULOCYTES # BLD AUTO: 0.06 X10(3) UL (ref 0–1)
IMM GRANULOCYTES # BLD AUTO: 0.06 X10(3) UL (ref 0–1)
IMM GRANULOCYTES NFR BLD: 1 %
IMM GRANULOCYTES NFR BLD: 1 %
LYMPHOCYTES # BLD AUTO: 1.17 X10(3) UL (ref 1–4)
LYMPHOCYTES # BLD AUTO: 1.19 X10(3) UL (ref 1–4)
LYMPHOCYTES NFR BLD AUTO: 19.8 %
LYMPHOCYTES NFR BLD AUTO: 19.9 %
MCH RBC QN AUTO: 27.3 PG (ref 26–34)
MCH RBC QN AUTO: 27.7 PG (ref 26–34)
MCHC RBC AUTO-ENTMCNC: 31.9 G/DL (ref 31–37)
MCHC RBC AUTO-ENTMCNC: 32.1 G/DL (ref 31–37)
MCV RBC AUTO: 85.6 FL
MCV RBC AUTO: 86.3 FL
MONOCYTES # BLD AUTO: 0.35 X10(3) UL (ref 0.1–1)
MONOCYTES # BLD AUTO: 0.39 X10(3) UL (ref 0.1–1)
MONOCYTES NFR BLD AUTO: 5.9 %
MONOCYTES NFR BLD AUTO: 6.6 %
NEUTROPHILS # BLD AUTO: 4.07 X10 (3) UL (ref 1.5–7.7)
NEUTROPHILS # BLD AUTO: 4.07 X10(3) UL (ref 1.5–7.7)
NEUTROPHILS # BLD AUTO: 4.14 X10 (3) UL (ref 1.5–7.7)
NEUTROPHILS # BLD AUTO: 4.14 X10(3) UL (ref 1.5–7.7)
NEUTROPHILS NFR BLD AUTO: 68.8 %
NEUTROPHILS NFR BLD AUTO: 69.3 %
OSMOLALITY SERPL CALC.SUM OF ELEC: 294 MOSM/KG (ref 275–295)
PLATELET # BLD AUTO: 214 10(3)UL (ref 150–450)
PLATELET # BLD AUTO: 226 10(3)UL (ref 150–450)
POTASSIUM SERPL-SCNC: 4.4 MMOL/L (ref 3.5–5.1)
PROT SERPL-MCNC: 7 G/DL (ref 6.4–8.2)
RBC # BLD AUTO: 4.22 X10(6)UL
RBC # BLD AUTO: 4.25 X10(6)UL
SODIUM SERPL-SCNC: 138 MMOL/L (ref 136–145)
WBC # BLD AUTO: 5.9 X10(3) UL (ref 4–11)
WBC # BLD AUTO: 6 X10(3) UL (ref 4–11)

## 2024-09-04 PROCEDURE — 99214 OFFICE O/P EST MOD 30 MIN: CPT | Performed by: SPECIALIST

## 2024-09-04 PROCEDURE — G2211 COMPLEX E/M VISIT ADD ON: HCPCS | Performed by: SPECIALIST

## 2024-09-04 RX ORDER — GLIMEPIRIDE 2 MG/1
2 TABLET ORAL EVERY MORNING
COMMUNITY
Start: 2024-08-14

## 2024-09-04 NOTE — PROGRESS NOTES
Patient is here for 3 month follow up for CML. Patient continues on Nilotinib daily. Patient reports chronic insomnia. Denies pain. Appetite is good. Ongoing SOB, not a new symptom for patient. No cough or chest pain.    Education Record    Learner:  Patient    Disease / Diagnosis:  CML    Barriers / Limitations:  None   Comments:    Method:  Discussion   Comments:    General Topics:  Plan of care reviewed   Comments:    Outcome:  Shows understanding   Comments:

## 2024-09-09 LAB
BCR-ABL1 INTERPRETATION: DETECTED
NS MOLECULAR RESPONSE VALUE: 3.89

## 2024-09-09 NOTE — TELEPHONE ENCOUNTER
MD Marry Perkins, MARTHA             Please call patient. Let him know that his CML number (BCR/ABL) is 0.14 down from 0.26. Results are good. We'll see him in 3 months. yes

## 2024-09-10 ENCOUNTER — TELEPHONE (OUTPATIENT)
Dept: HEMATOLOGY/ONCOLOGY | Facility: HOSPITAL | Age: 74
End: 2024-09-10

## 2024-09-10 NOTE — TELEPHONE ENCOUNTER
Test(s) completed:BCR/ABL    Results:per Dr Cruz \"Let him know that his BCR/ABL is lower still. Good result. I will see him again in 3 months.\"    Plan:left patient a voicemail message.

## 2024-12-01 NOTE — PROGRESS NOTES
[facility] Progress Note      Patient Name: Darryn Juarez   YOB: 1950  Medical Record Number: [id number]  Attending Physician: Gavin Cruz M.D.     The 21st Century Cures Act makes medical notes like these available to patients in the interest of transparency. Please be advised this is a medical document. Medical documents are intended to carry relevant information, facts as evident, and the clinical opinion of the practitioner. The medical note is intended as peer to peer communication and may appear blunt or direct. It is written in medical language and may contain abbreviations or verbiage that are unfamiliar.     Date of Visit: 12/11/2024       Chief Complaint  Chronic myelogenous leukemia - follow up.    Oncologic History  Darryn Juarez Sr. is a 74 year old male undergoing cardiac workup for coronary artery disease, dilated cardiomyopathy, and dyspnea on exertion who was found to have a markedly elevated white blood cell count on complete blood count. On 07/21/2017 complete blood count showed a WBC of 128 K/mcl with 40% neutrophils, 28% bands, 5% lymphocytes, 9% monocytes, 1% eosinophils, 3% basophils, 9 metamyelocytes, 4% myelocytes, 1% blasts; hemoglobin 9.5 g/dl with elevated MCV, and platelet count 117 K/mcl. 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 Pretreatment BCR/ABL was 0.19151 (ratio) and 41.7541%.    Patient was started on hydroxyurea while arrangements made for patient to receive nilotinib. At beginning of 09/2017 patient started nilotinib 300 mg bid. When white blood cell count fell below 80 K/mcl, hydroxyurea was discontinued.     History of Present Illness  Patient returns for follow up. He denies any new adverse effects to nilotinib. He recently had a diarrheal illness but it has since resolved. He recently had a Cologuard test which was positive and is scheduled for colonoscopy next month.    Performance Status   Karnofsky 70% - Unable to do active work, but able to  care for self.    Past Medical History (historical data, reviewed by physician)  Dilated cardiomyopathy with LVEF 52%; depression/anxiety; diabetes mellitus; hypertension; hyperlipidemia; GERD; coronary artery disease with evidence of MI on stress test 11/2016.    Past Surgical History (historical data, reviewed by physician)  Cholecystectomy; excisional cervical lymph node biopsy.     Family History (historical data, reviewed by physician)  Brother with sarcoma; daughter with CNS lymphoma.     Social History (historical data, reviewed by physician)  Previous tobacco user but quit 1997; denies alcohol use.       Current Medications    carvedilol 12.5 MG Oral Tab Take 1 tablet (12.5 mg total) by mouth 2 (two) times daily.      glimepiride 2 MG Oral Tab Take 1 tablet (2 mg total) by mouth every morning.      metFORMIN HCl 1000 MG Oral Tab Take 1 tablet (1,000 mg total) by mouth 2 (two) times daily.      carBAMazepine 100 MG Oral Chew Tab Chew 1 tablet (100 mg total) by mouth 2 (two) times daily.      QUEtiapine 200 MG Oral Tab Take 1 tablet (200 mg total) by mouth 3 (three) times daily. Total dose 250 mg      QUEtiapine 50 MG Oral Tab Take 1 tablet (50 mg total) by mouth 3 (three) times daily. Total dose 250 mg      Nilotinib HCl 150 MG Oral Cap Take 150 mg by mouth 2 (two) times daily. (Patient taking differently: Take 300 mg by mouth 2 (two) times daily.) 360 capsule 3    lisinopril 10 MG Oral Tab Take 2 tablets (20 mg total) by mouth daily.      Polyethylene Glycol 3350 (MIRALAX) 17 GM/SCOOP Oral Powder Take 17 g by mouth daily.      Fenofibrate 145 MG Oral Tab Take 1 tablet (145 mg total) by mouth daily.      allopurinol 300 MG Oral Tab Take 1 tablet (300 mg total) by mouth daily.      Pantoprazole Sodium 40 MG Oral Tab EC Take 1 tablet (40 mg total) by mouth 2 (two) times daily before meals.      folic acid 1 MG Oral Tab Take 1 tablet (1 mg total) by mouth daily.      Prazosin HCl 1 MG Oral Cap Take 1 capsule (1  mg total) by mouth 2 (two) times daily.      Prochlorperazine Maleate (COMPAZINE) 10 mg tablet Take 1 tablet (10 mg total) by mouth every 6 (six) hours as needed for Nausea. 30 tablet 3    Albuterol Sulfate  (90 Base) MCG/ACT Inhalation Aero Soln Inhale 2 puffs into the lungs every 6 (six) hours as needed for Wheezing. 1 Inhaler 0    DULoxetine HCl 60 MG Oral Cap DR Particles Take 1 capsule (60 mg total) by mouth daily.      aspirin 81 MG Oral Chew Tab Chew 1 tablet (81 mg total) by mouth daily. Took 4 this a.m.      Ondansetron HCl (ZOFRAN) 8 MG tablet Take 1 tablet (8 mg total) by mouth every 8 (eight) hours as needed for Nausea. 30 tablet 3    multiple vitamin Oral Chew Tab Chew 1 tablet by mouth daily.       Allergies   Mr. Juarez is allergic to haloperidol, tamsulosin, and primidone.     Vital Signs   /68 (BP Location: Left arm, Patient Position: Sitting, Cuff Size: large)   Pulse 75   Temp 97.4 °F (36.3 °C) (Tympanic)   Resp 18   Ht 1.803 m (5' 10.98\")   Wt 127.5 kg (281 lb)   SpO2 94%   BMI 39.21 kg/m²     Physical Examination   Constitutional      Well developed, well nourished. Appears close to chronological age. No apparent distress.   Head                   Normocephalic and atraumatic.  Eyes                   Conjunctiva clear; sclera anicteric.  ENMT                 External nose normal; external ears normal.  Neck                   Supple.  Hematologic/Lymphatic No cervical, supraclavicular lymphadenopathy.  Respiratory          Normal effort; no respiratory distress; clear to auscultation bilaterally.   Cardiovascular     Regular rate and rhythm; normal S1S2.  Abdomen            Soft; nontender; no hepatomegaly; spleen tip not palpable.   Extremities          No lower extremity edema.  Neurologic           Motor and sensory grossly intact.    Laboratory   Recent Results (from the past 24 hours)   COMP METABOLIC PANEL [E]    Collection Time: 12/11/24  1:51 PM   Result Value Ref  Range    Glucose 106 (H) 70 - 99 mg/dL    Sodium 140 136 - 145 mmol/L    Potassium 4.0 3.5 - 5.1 mmol/L    Chloride 107 98 - 112 mmol/L    CO2 28.0 21.0 - 32.0 mmol/L    Anion Gap 5 0 - 18 mmol/L    BUN 18 9 - 23 mg/dL    Creatinine 1.05 0.70 - 1.30 mg/dL    Calcium, Total 10.0 8.7 - 10.4 mg/dL    Calculated Osmolality 292 275 - 295 mOsm/kg    eGFR-Cr 74 >=60 mL/min/1.73m2    AST 25 <34 U/L    ALT 18 10 - 49 U/L    Alkaline Phosphatase 60 45 - 117 U/L    Bilirubin, Total 0.2 0.2 - 1.1 mg/dL    Total Protein 6.7 5.7 - 8.2 g/dL    Albumin 4.5 3.2 - 4.8 g/dL    Globulin  2.2 2.0 - 3.5 g/dL    A/G Ratio 2.0 1.0 - 2.0    Patient Fasting for CMP? No    CBC W/DIFF [E]    Collection Time: 12/11/24  1:51 PM   Result Value Ref Range    WBC 5.4 4.0 - 11.0 x10(3) uL    RBC 3.98 3.80 - 5.80 x10(6)uL    HGB 10.9 (L) 13.0 - 17.5 g/dL    HCT 34.4 (L) 39.0 - 53.0 %    .0 150.0 - 450.0 10(3)uL    MCV 86.4 80.0 - 100.0 fL    MCH 27.4 26.0 - 34.0 pg    MCHC 31.7 31.0 - 37.0 g/dL    RDW 15.3 %    Neutrophil Absolute Prelim 3.42 1.50 - 7.70 x10 (3) uL    Neutrophil Absolute 3.42 1.50 - 7.70 x10(3) uL    Lymphocyte Absolute 1.28 1.00 - 4.00 x10(3) uL    Monocyte Absolute 0.35 0.10 - 1.00 x10(3) uL    Eosinophil Absolute 0.22 0.00 - 0.70 x10(3) uL    Basophil Absolute 0.02 0.00 - 0.20 x10(3) uL    Immature Granulocyte Absolute 0.06 0.00 - 1.00 x10(3) uL    Neutrophil % 64.0 %    Lymphocyte % 23.9 %    Monocyte % 6.5 %    Eosinophil % 4.1 %    Basophil % 0.4 %    Immature Granulocyte % 1.1 %   CBC w/Auto Diff    Collection Time: 12/11/24  1:51 PM   Result Value Ref Range    WBC 5.3 4.0 - 11.0 x10(3) uL    RBC 4.06 3.80 - 5.80 x10(6)uL    HGB 11.0 (L) 13.0 - 17.5 g/dL    HCT 35.2 (L) 39.0 - 53.0 %    .0 150.0 - 450.0 10(3)uL    MCV 86.7 80.0 - 100.0 fL    MCH 27.1 26.0 - 34.0 pg    MCHC 31.3 31.0 - 37.0 g/dL    RDW 15.2 %    Neutrophil Absolute Prelim 3.43 1.50 - 7.70 x10 (3) uL    Neutrophil Absolute 3.43 1.50 - 7.70 x10(3) uL     Lymphocyte Absolute 1.29 1.00 - 4.00 x10(3) uL    Monocyte Absolute 0.35 0.10 - 1.00 x10(3) uL    Eosinophil Absolute 0.18 0.00 - 0.70 x10(3) uL    Basophil Absolute 0.02 0.00 - 0.20 x10(3) uL    Immature Granulocyte Absolute 0.06 0.00 - 1.00 x10(3) uL    Neutrophil % 64.3 %    Lymphocyte % 24.2 %    Monocyte % 6.6 %    Eosinophil % 3.4 %    Basophil % 0.4 %    Immature Granulocyte % 1.1 %     Impression and Plan   1.   Chronic myelogenous leukemia: Tolerating nivolumab well. PCR for BCR/ABL at last visit was consistent with MMR3. Await results of PCR for BCR/ABL. If stable, there will be no recommended changes to his therapy.    2.   Positive Cologuard test: Patient is scheduled for colonoscopy.     Planned Follow Up   Patient will return for follow up in 3 months; he will be contacted with his PCR results.    Electronically signed by:    Gavin Cruz M.D.  System Medical Director of Oncology Services  [facility]

## 2024-12-11 ENCOUNTER — OFFICE VISIT (OUTPATIENT)
Dept: HEMATOLOGY/ONCOLOGY | Age: 74
End: 2024-12-11
Attending: SPECIALIST
Payer: MEDICARE

## 2024-12-11 VITALS
BODY MASS INDEX: 39.34 KG/M2 | HEART RATE: 75 BPM | TEMPERATURE: 97 F | SYSTOLIC BLOOD PRESSURE: 147 MMHG | OXYGEN SATURATION: 94 % | DIASTOLIC BLOOD PRESSURE: 68 MMHG | HEIGHT: 70.98 IN | RESPIRATION RATE: 18 BRPM | WEIGHT: 281 LBS

## 2024-12-11 DIAGNOSIS — C92.10 CML (CHRONIC MYELOCYTIC LEUKEMIA) (HCC): ICD-10-CM

## 2024-12-11 DIAGNOSIS — Z79.899 MEDICATION MANAGEMENT: Primary | ICD-10-CM

## 2024-12-11 LAB
ALBUMIN SERPL-MCNC: 4.5 G/DL (ref 3.2–4.8)
ALBUMIN/GLOB SERPL: 2 {RATIO} (ref 1–2)
ALP LIVER SERPL-CCNC: 60 U/L
ALT SERPL-CCNC: 18 U/L
ANION GAP SERPL CALC-SCNC: 5 MMOL/L (ref 0–18)
AST SERPL-CCNC: 25 U/L (ref ?–34)
BASOPHILS # BLD AUTO: 0.02 X10(3) UL (ref 0–0.2)
BASOPHILS # BLD AUTO: 0.02 X10(3) UL (ref 0–0.2)
BASOPHILS NFR BLD AUTO: 0.4 %
BASOPHILS NFR BLD AUTO: 0.4 %
BILIRUB SERPL-MCNC: 0.2 MG/DL (ref 0.2–1.1)
BUN BLD-MCNC: 18 MG/DL (ref 9–23)
CALCIUM BLD-MCNC: 10 MG/DL (ref 8.7–10.4)
CHLORIDE SERPL-SCNC: 107 MMOL/L (ref 98–112)
CO2 SERPL-SCNC: 28 MMOL/L (ref 21–32)
CREAT BLD-MCNC: 1.05 MG/DL
EGFRCR SERPLBLD CKD-EPI 2021: 74 ML/MIN/1.73M2 (ref 60–?)
EOSINOPHIL # BLD AUTO: 0.18 X10(3) UL (ref 0–0.7)
EOSINOPHIL # BLD AUTO: 0.22 X10(3) UL (ref 0–0.7)
EOSINOPHIL NFR BLD AUTO: 3.4 %
EOSINOPHIL NFR BLD AUTO: 4.1 %
ERYTHROCYTE [DISTWIDTH] IN BLOOD BY AUTOMATED COUNT: 15.2 %
ERYTHROCYTE [DISTWIDTH] IN BLOOD BY AUTOMATED COUNT: 15.3 %
FASTING STATUS PATIENT QL REPORTED: NO
GLOBULIN PLAS-MCNC: 2.2 G/DL (ref 2–3.5)
GLUCOSE BLD-MCNC: 106 MG/DL (ref 70–99)
HCT VFR BLD AUTO: 34.4 %
HCT VFR BLD AUTO: 35.2 %
HGB BLD-MCNC: 10.9 G/DL
HGB BLD-MCNC: 11 G/DL
IMM GRANULOCYTES # BLD AUTO: 0.06 X10(3) UL (ref 0–1)
IMM GRANULOCYTES # BLD AUTO: 0.06 X10(3) UL (ref 0–1)
IMM GRANULOCYTES NFR BLD: 1.1 %
IMM GRANULOCYTES NFR BLD: 1.1 %
LYMPHOCYTES # BLD AUTO: 1.28 X10(3) UL (ref 1–4)
LYMPHOCYTES # BLD AUTO: 1.29 X10(3) UL (ref 1–4)
LYMPHOCYTES NFR BLD AUTO: 23.9 %
LYMPHOCYTES NFR BLD AUTO: 24.2 %
MCH RBC QN AUTO: 27.1 PG (ref 26–34)
MCH RBC QN AUTO: 27.4 PG (ref 26–34)
MCHC RBC AUTO-ENTMCNC: 31.3 G/DL (ref 31–37)
MCHC RBC AUTO-ENTMCNC: 31.7 G/DL (ref 31–37)
MCV RBC AUTO: 86.4 FL
MCV RBC AUTO: 86.7 FL
MONOCYTES # BLD AUTO: 0.35 X10(3) UL (ref 0.1–1)
MONOCYTES # BLD AUTO: 0.35 X10(3) UL (ref 0.1–1)
MONOCYTES NFR BLD AUTO: 6.5 %
MONOCYTES NFR BLD AUTO: 6.6 %
NEUTROPHILS # BLD AUTO: 3.42 X10 (3) UL (ref 1.5–7.7)
NEUTROPHILS # BLD AUTO: 3.42 X10(3) UL (ref 1.5–7.7)
NEUTROPHILS # BLD AUTO: 3.43 X10 (3) UL (ref 1.5–7.7)
NEUTROPHILS # BLD AUTO: 3.43 X10(3) UL (ref 1.5–7.7)
NEUTROPHILS NFR BLD AUTO: 64 %
NEUTROPHILS NFR BLD AUTO: 64.3 %
OSMOLALITY SERPL CALC.SUM OF ELEC: 292 MOSM/KG (ref 275–295)
PLATELET # BLD AUTO: 213 10(3)UL (ref 150–450)
PLATELET # BLD AUTO: 220 10(3)UL (ref 150–450)
POTASSIUM SERPL-SCNC: 4 MMOL/L (ref 3.5–5.1)
PROT SERPL-MCNC: 6.7 G/DL (ref 5.7–8.2)
RBC # BLD AUTO: 3.98 X10(6)UL
RBC # BLD AUTO: 4.06 X10(6)UL
SODIUM SERPL-SCNC: 140 MMOL/L (ref 136–145)
WBC # BLD AUTO: 5.3 X10(3) UL (ref 4–11)
WBC # BLD AUTO: 5.4 X10(3) UL (ref 4–11)

## 2024-12-11 PROCEDURE — 99214 OFFICE O/P EST MOD 30 MIN: CPT | Performed by: SPECIALIST

## 2024-12-11 PROCEDURE — G2211 COMPLEX E/M VISIT ADD ON: HCPCS | Performed by: SPECIALIST

## 2024-12-11 RX ORDER — CARVEDILOL 12.5 MG/1
12.5 TABLET ORAL 2 TIMES DAILY
COMMUNITY
Start: 2024-11-09

## 2024-12-11 NOTE — PROGRESS NOTES
Patient is here for 3 month MD follow up for CML. Patient is on Nilotinib 300 mg BID. Patient had diarrhea for 9 days, resolved as of Monday. Patient attributes this to food poisoning. Otherwise tolerating Nilotinib. Mild fatigue but manageable.     Education Record    Learner:  Patient    Disease / Diagnosis:  CML    Barriers / Limitations:  None   Comments:    Method:  Discussion   Comments:    General Topics:  Plan of care reviewed   Comments:    Outcome:  Shows understanding   Comments:

## 2024-12-13 LAB
BCR-ABL1 INTERPRETATION: DETECTED
NS MOLECULAR RESPONSE VALUE: 3.77

## 2024-12-16 ENCOUNTER — TELEPHONE (OUTPATIENT)
Age: 74
End: 2024-12-16

## 2024-12-16 NOTE — TELEPHONE ENCOUNTER
Test(s) completed:BCR/ABL    Results: per Dr Cruz \"BCR/ABL is stable. Good result.\"    Plan: Patient notified

## 2024-12-17 ENCOUNTER — TELEPHONE (OUTPATIENT)
Dept: HEMATOLOGY/ONCOLOGY | Facility: HOSPITAL | Age: 74
End: 2024-12-17

## 2024-12-17 NOTE — TELEPHONE ENCOUNTER
Called Dr Coyne's office. Left a message for Trice to inform her, Dr Cruz does not need an EGD from hem/onc standpoint. He will leave it up to Dr Coyne if patient needs one.

## 2024-12-17 NOTE — TELEPHONE ENCOUNTER
Trice calling from GI office patient is having a colonoscopy on 1/7/25 asking if dr deleon would like a EGD done as well. Please reach out to Trice at [phone] . Thank you Emily

## 2025-01-29 ENCOUNTER — TELEPHONE (OUTPATIENT)
Age: 75
End: 2025-01-29

## 2025-03-12 ENCOUNTER — TELEPHONE (OUTPATIENT)
Age: 75
End: 2025-03-12

## 2025-03-12 ENCOUNTER — OFFICE VISIT (OUTPATIENT)
Age: 75
End: 2025-03-12
Attending: SPECIALIST
Payer: MEDICARE

## 2025-03-12 VITALS
TEMPERATURE: 98 F | HEIGHT: 70.98 IN | DIASTOLIC BLOOD PRESSURE: 67 MMHG | BODY MASS INDEX: 39.34 KG/M2 | RESPIRATION RATE: 20 BRPM | WEIGHT: 281 LBS | SYSTOLIC BLOOD PRESSURE: 126 MMHG | HEART RATE: 92 BPM | OXYGEN SATURATION: 92 %

## 2025-03-12 DIAGNOSIS — C92.10 CML (CHRONIC MYELOCYTIC LEUKEMIA) (HCC): ICD-10-CM

## 2025-03-12 DIAGNOSIS — Z79.899 MEDICATION MANAGEMENT: Primary | ICD-10-CM

## 2025-03-12 LAB
ALBUMIN SERPL-MCNC: 4.5 G/DL (ref 3.2–4.8)
ALBUMIN/GLOB SERPL: 1.7 {RATIO} (ref 1–2)
ALP LIVER SERPL-CCNC: 62 U/L
ALT SERPL-CCNC: 23 U/L
ANION GAP SERPL CALC-SCNC: 6 MMOL/L (ref 0–18)
AST SERPL-CCNC: 28 U/L (ref ?–34)
BASOPHILS # BLD AUTO: 0.03 X10(3) UL (ref 0–0.2)
BASOPHILS NFR BLD AUTO: 0.5 %
BILIRUB SERPL-MCNC: 0.2 MG/DL (ref 0.2–1.1)
BUN BLD-MCNC: 23 MG/DL (ref 9–23)
CALCIUM BLD-MCNC: 9.9 MG/DL (ref 8.7–10.6)
CHLORIDE SERPL-SCNC: 106 MMOL/L (ref 98–112)
CO2 SERPL-SCNC: 28 MMOL/L (ref 21–32)
CREAT BLD-MCNC: 1.05 MG/DL
EGFRCR SERPLBLD CKD-EPI 2021: 74 ML/MIN/1.73M2 (ref 60–?)
EOSINOPHIL # BLD AUTO: 0.17 X10(3) UL (ref 0–0.7)
EOSINOPHIL NFR BLD AUTO: 2.8 %
ERYTHROCYTE [DISTWIDTH] IN BLOOD BY AUTOMATED COUNT: 15.5 %
GLOBULIN PLAS-MCNC: 2.7 G/DL (ref 2–3.5)
GLUCOSE BLD-MCNC: 108 MG/DL (ref 70–99)
HCT VFR BLD AUTO: 35.7 %
HGB BLD-MCNC: 11.2 G/DL
IMM GRANULOCYTES # BLD AUTO: 0.05 X10(3) UL (ref 0–1)
IMM GRANULOCYTES NFR BLD: 0.8 %
LYMPHOCYTES # BLD AUTO: 1.36 X10(3) UL (ref 1–4)
LYMPHOCYTES NFR BLD AUTO: 22.1 %
MCH RBC QN AUTO: 26.7 PG (ref 26–34)
MCHC RBC AUTO-ENTMCNC: 31.4 G/DL (ref 31–37)
MCV RBC AUTO: 85 FL
MONOCYTES # BLD AUTO: 0.42 X10(3) UL (ref 0.1–1)
MONOCYTES NFR BLD AUTO: 6.8 %
NEUTROPHILS # BLD AUTO: 4.12 X10 (3) UL (ref 1.5–7.7)
NEUTROPHILS # BLD AUTO: 4.12 X10(3) UL (ref 1.5–7.7)
NEUTROPHILS NFR BLD AUTO: 67 %
OSMOLALITY SERPL CALC.SUM OF ELEC: 294 MOSM/KG (ref 275–295)
PLATELET # BLD AUTO: 213 10(3)UL (ref 150–450)
POTASSIUM SERPL-SCNC: 4.4 MMOL/L (ref 3.5–5.1)
PROT SERPL-MCNC: 7.2 G/DL (ref 5.7–8.2)
RBC # BLD AUTO: 4.2 X10(6)UL
SODIUM SERPL-SCNC: 140 MMOL/L (ref 136–145)
WBC # BLD AUTO: 6.2 X10(3) UL (ref 4–11)

## 2025-03-12 RX ORDER — AMLODIPINE BESYLATE 5 MG/1
5 TABLET ORAL DAILY
COMMUNITY

## 2025-03-12 NOTE — TELEPHONE ENCOUNTER
Pt called stated he need help with getting his medication. I asked for the name of the medication pt wasn't sure of the name he stated someone helped him before name tyson.    Pt said it's through [employer] but doesn't know medication     Please call pt back

## 2025-03-12 NOTE — PROGRESS NOTES
Education Record    Learner:  Patient    Disease / Diagnosis: CML    Barriers / Limitations:  None   Comments:    Method:  Brief focused and Reinforcement   Comments:    General Topics:  Diet, Medication, Side effects and symptom management, and Plan of care reviewed   Comments:    Outcome:  Shows understanding   Comments:    Patient here for follow up. He's taking Tasigna as ordered. He c/o NS after eating and SOB w/ activity.

## 2025-03-12 NOTE — PROGRESS NOTES
[facility] Progress Note       Patient Name: Darryn Juarez   YOB: 1950  Medical Record Number: [id number]  Attending Physician: Gavin Cruz M.D.     The 21st Century Cures Act makes medical notes like these available to patients in the interest of transparency. Please be advised this is a medical document. Medical documents are intended to carry relevant information, facts as evident, and the clinical opinion of the practitioner. The medical note is intended as peer to peer communication and may appear blunt or direct. It is written in medical language and may contain abbreviations or verbiage that are unfamiliar.     Date of Visit: 3/12/2025       Chief Complaint  Chronic myelogenous leukemia - follow up.    Oncologic History  Darryn Juarez Sr. is a 74 year old male undergoing cardiac workup for coronary artery disease, dilated cardiomyopathy, and dyspnea on exertion who was found to have a markedly elevated white blood cell count on complete blood count. On 07/21/2017 complete blood count showed a WBC of 128 K/mcl with 40% neutrophils, 28% bands, 5% lymphocytes, 9% monocytes, 1% eosinophils, 3% basophils, 9 metamyelocytes, 4% myelocytes, 1% blasts; hemoglobin 9.5 g/dl with elevated MCV, and platelet count 117 K/mcl. 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 Pretreatment BCR/ABL was 0.88510 (ratio) and 41.7541%.    Patient was started on hydroxyurea while arrangements made for patient to receive nilotinib. At beginning of 09/2017 patient started nilotinib 300 mg bid. When white blood cell count fell below 80 K/mcl, hydroxyurea was discontinued.     History of Present Illness  Patient returns for follow up. He denies any new adverse effects to nilotinib.     Performance Status   Karnofsky 70% - Unable to do active work, but able to care for self.    Past Medical History (historical data, reviewed by physician)  Dilated cardiomyopathy with LVEF 52%; depression/anxiety; diabetes  mellitus; hypertension; hyperlipidemia; GERD; coronary artery disease with evidence of MI on stress test 11/2016.    Past Surgical History (historical data, reviewed by physician)  Cholecystectomy; excisional cervical lymph node biopsy.     Family History (historical data, reviewed by physician)  Brother with sarcoma; daughter with CNS lymphoma.     Social History (historical data, reviewed by physician)  Previous tobacco user but quit 1997; denies alcohol use.       Current Medications    amLODIPine 5 MG Oral Tab Take 1 tablet (5 mg total) by mouth daily.      carvedilol 12.5 MG Oral Tab Take 1 tablet (12.5 mg total) by mouth 2 (two) times daily.      glimepiride 2 MG Oral Tab Take 1 tablet (2 mg total) by mouth every morning.      metFORMIN HCl 1000 MG Oral Tab Take 1 tablet (1,000 mg total) by mouth 2 (two) times daily.      carBAMazepine 100 MG Oral Chew Tab Chew 1 tablet (100 mg total) by mouth 2 (two) times daily.      QUEtiapine 200 MG Oral Tab Take 1 tablet (200 mg total) by mouth 3 (three) times daily. Total dose 250 mg      QUEtiapine 50 MG Oral Tab Take 1 tablet (50 mg total) by mouth 3 (three) times daily. Total dose 250 mg      Nilotinib HCl 150 MG Oral Cap Take 150 mg by mouth 2 (two) times daily. (Patient taking differently: Take 300 mg by mouth 2 (two) times daily.) 360 capsule 3    lisinopril 10 MG Oral Tab Take 2 tablets (20 mg total) by mouth daily.      Polyethylene Glycol 3350 (MIRALAX) 17 GM/SCOOP Oral Powder Take 17 g by mouth daily.      Fenofibrate 145 MG Oral Tab Take 1 tablet (145 mg total) by mouth daily.      allopurinol 300 MG Oral Tab Take 1 tablet (300 mg total) by mouth daily.      Pantoprazole Sodium 40 MG Oral Tab EC Take 1 tablet (40 mg total) by mouth 2 (two) times daily before meals.      folic acid 1 MG Oral Tab Take 1 tablet (1 mg total) by mouth daily.      Prazosin HCl 1 MG Oral Cap Take 1 capsule (1 mg total) by mouth 2 (two) times daily.      Prochlorperazine Maleate  (COMPAZINE) 10 mg tablet Take 1 tablet (10 mg total) by mouth every 6 (six) hours as needed for Nausea. 30 tablet 3    Albuterol Sulfate  (90 Base) MCG/ACT Inhalation Aero Soln Inhale 2 puffs into the lungs every 6 (six) hours as needed for Wheezing. 1 Inhaler 0    DULoxetine HCl 60 MG Oral Cap DR Particles Take 1 capsule (60 mg total) by mouth daily.      aspirin 81 MG Oral Chew Tab Chew 1 tablet (81 mg total) by mouth daily. Took 4 this a.m.      Ondansetron HCl (ZOFRAN) 8 MG tablet Take 1 tablet (8 mg total) by mouth every 8 (eight) hours as needed for Nausea. 30 tablet 3    multiple vitamin Oral Chew Tab Chew 1 tablet by mouth daily.       Allergies   Mr. Juarez is allergic to haloperidol, tamsulosin, and primidone.     Vital Signs   /67 (BP Location: Left arm, Patient Position: Sitting, Cuff Size: large)   Pulse 92   Temp 97.6 °F (36.4 °C) (Temporal)   Resp 20   Ht 1.803 m (5' 10.98\")   Wt 127.5 kg (281 lb)   SpO2 92%   BMI 39.21 kg/m²     Physical Examination   Constitutional      Well developed, well nourished. No apparent distress.   Head                   Normocephalic and atraumatic.  Eyes                   Conjunctiva clear; sclera anicteric.  ENMT                 External nose normal; external ears normal.  Neck                   Supple.  Hematologic/Lymphatic No cervical, supraclavicular lymphadenopathy.  Respiratory          Normal effort; no respiratory distress; clear to auscultation bilaterally.   Cardiovascular     Regular rate and rhythm; normal S1S2.  Abdomen            Soft; nontender; no hepatomegaly; spleen tip not palpable.   Extremities          No lower extremity edema.  Neurologic           Motor and sensory grossly intact.    Laboratory   Recent Results (from the past 24 hours)   CBC W/DIFF [E]    Collection Time: 03/12/25  1:13 PM   Result Value Ref Range    WBC 6.2 4.0 - 11.0 x10(3) uL    RBC 4.20 3.80 - 5.80 x10(6)uL    HGB 11.2 (L) 13.0 - 17.5 g/dL    HCT 35.7 (L)  39.0 - 53.0 %    .0 150.0 - 450.0 10(3)uL    MCV 85.0 80.0 - 100.0 fL    MCH 26.7 26.0 - 34.0 pg    MCHC 31.4 31.0 - 37.0 g/dL    RDW 15.5 %    Neutrophil Absolute Prelim 4.12 1.50 - 7.70 x10 (3) uL    Neutrophil Absolute 4.12 1.50 - 7.70 x10(3) uL    Lymphocyte Absolute 1.36 1.00 - 4.00 x10(3) uL    Monocyte Absolute 0.42 0.10 - 1.00 x10(3) uL    Eosinophil Absolute 0.17 0.00 - 0.70 x10(3) uL    Basophil Absolute 0.03 0.00 - 0.20 x10(3) uL    Immature Granulocyte Absolute 0.05 0.00 - 1.00 x10(3) uL    Neutrophil % 67.0 %    Lymphocyte % 22.1 %    Monocyte % 6.8 %    Eosinophil % 2.8 %    Basophil % 0.5 %    Immature Granulocyte % 0.8 %   COMP METABOLIC PANEL [E]    Collection Time: 03/12/25  1:13 PM   Result Value Ref Range    Glucose 108 (H) 70 - 99 mg/dL    Sodium 140 136 - 145 mmol/L    Potassium 4.4 3.5 - 5.1 mmol/L    Chloride 106 98 - 112 mmol/L    CO2 28.0 21.0 - 32.0 mmol/L    Anion Gap 6 0 - 18 mmol/L    BUN 23 9 - 23 mg/dL    Creatinine 1.05 0.70 - 1.30 mg/dL    Calcium, Total 9.9 8.7 - 10.6 mg/dL    Calculated Osmolality 294 275 - 295 mOsm/kg    eGFR-Cr 74 >=60 mL/min/1.73m2    AST 28 <34 U/L    ALT 23 10 - 49 U/L    Alkaline Phosphatase 62 45 - 117 U/L    Bilirubin, Total 0.2 0.2 - 1.1 mg/dL    Total Protein 7.2 5.7 - 8.2 g/dL    Albumin 4.5 3.2 - 4.8 g/dL    Globulin  2.7 2.0 - 3.5 g/dL    A/G Ratio 1.7 1.0 - 2.0    Patient Fasting for CMP? Patient not present      Impression and Plan   1.   Chronic myelogenous leukemia: Tolerating nivolumab well. PCR for BCR/ABL at last visit was consistent with MMR3. Await results of PCR for BCR/ABL. If stable, there will be no recommended changes to his therapy.    Planned Follow Up   Patient will return for follow up in 3 months; he will be contacted with his PCR results.    Electronically Signed by:     Gavin Cruz M.D.  System Medical Director, Oncology Services  [place] [facility]

## 2025-03-15 LAB
BCR-ABL1 INTERPRETATION: DETECTED
NS MOLECULAR RESPONSE VALUE: 3.62

## 2025-03-17 ENCOUNTER — TELEPHONE (OUTPATIENT)
Age: 75
End: 2025-03-17

## 2025-03-17 NOTE — TELEPHONE ENCOUNTER
Test(s) completed: BCR/ABL    Results: per Dr Cruz \"BCR/ABL shows continued remission. Good result.\"    Plan: patient notified of results

## 2025-05-20 ENCOUNTER — TELEPHONE (OUTPATIENT)
Age: 75
End: 2025-05-20

## 2025-05-20 DIAGNOSIS — C92.10 CML (CHRONIC MYELOCYTIC LEUKEMIA) (HCC): ICD-10-CM

## 2025-05-20 NOTE — TELEPHONE ENCOUNTER
Pt is currently taking the medication Tasigna 150 mg.  He has been taking 2 in the morning and 2 at night.    His pharmacy [facility] will not renew because they say the prescription should be 1 in the morning and 1 at night.  Please call pt to discuss this.

## 2025-06-11 ENCOUNTER — OFFICE VISIT (OUTPATIENT)
Age: 75
End: 2025-06-11
Attending: SPECIALIST
Payer: MEDICARE

## 2025-06-11 ENCOUNTER — NURSE ONLY (OUTPATIENT)
Age: 75
End: 2025-06-11
Attending: SPECIALIST
Payer: MEDICARE

## 2025-06-11 VITALS
DIASTOLIC BLOOD PRESSURE: 67 MMHG | HEART RATE: 89 BPM | SYSTOLIC BLOOD PRESSURE: 107 MMHG | HEIGHT: 70.98 IN | RESPIRATION RATE: 18 BRPM | WEIGHT: 280.81 LBS | TEMPERATURE: 97 F | OXYGEN SATURATION: 92 % | BODY MASS INDEX: 39.31 KG/M2

## 2025-06-11 DIAGNOSIS — C92.10 CML (CHRONIC MYELOCYTIC LEUKEMIA) (HCC): ICD-10-CM

## 2025-06-11 DIAGNOSIS — Z51.81 MEDICATION MONITORING ENCOUNTER: Primary | ICD-10-CM

## 2025-06-11 LAB
ALBUMIN SERPL-MCNC: 4.4 G/DL (ref 3.2–4.8)
ALBUMIN/GLOB SERPL: 1.9 {RATIO} (ref 1–2)
ALP LIVER SERPL-CCNC: 63 U/L (ref 45–117)
ALT SERPL-CCNC: 25 U/L (ref 10–49)
ANION GAP SERPL CALC-SCNC: 7 MMOL/L (ref 0–18)
AST SERPL-CCNC: 30 U/L (ref ?–34)
BASOPHILS # BLD AUTO: 0.03 X10(3) UL (ref 0–0.2)
BASOPHILS NFR BLD AUTO: 0.5 %
BILIRUB SERPL-MCNC: 0.2 MG/DL (ref 0.2–1.1)
BUN BLD-MCNC: 30 MG/DL (ref 9–23)
CALCIUM BLD-MCNC: 9.3 MG/DL (ref 8.7–10.6)
CHLORIDE SERPL-SCNC: 105 MMOL/L (ref 98–112)
CO2 SERPL-SCNC: 28 MMOL/L (ref 21–32)
CREAT BLD-MCNC: 1.32 MG/DL (ref 0.7–1.3)
EGFRCR SERPLBLD CKD-EPI 2021: 56 ML/MIN/1.73M2 (ref 60–?)
EOSINOPHIL # BLD AUTO: 0.14 X10(3) UL (ref 0–0.7)
EOSINOPHIL NFR BLD AUTO: 2.1 %
ERYTHROCYTE [DISTWIDTH] IN BLOOD BY AUTOMATED COUNT: 15.9 %
FASTING STATUS PATIENT QL REPORTED: NO
GLOBULIN PLAS-MCNC: 2.3 G/DL (ref 2–3.5)
GLUCOSE BLD-MCNC: 147 MG/DL (ref 70–99)
HCT VFR BLD AUTO: 35 % (ref 39–53)
HGB BLD-MCNC: 11 G/DL (ref 13–17.5)
IMM GRANULOCYTES # BLD AUTO: 0.05 X10(3) UL (ref 0–1)
IMM GRANULOCYTES NFR BLD: 0.8 %
LYMPHOCYTES # BLD AUTO: 1.1 X10(3) UL (ref 1–4)
LYMPHOCYTES NFR BLD AUTO: 16.7 %
MCH RBC QN AUTO: 27.3 PG (ref 26–34)
MCHC RBC AUTO-ENTMCNC: 31.4 G/DL (ref 31–37)
MCV RBC AUTO: 86.8 FL (ref 80–100)
MONOCYTES # BLD AUTO: 0.37 X10(3) UL (ref 0.1–1)
MONOCYTES NFR BLD AUTO: 5.6 %
NEUTROPHILS # BLD AUTO: 4.89 X10 (3) UL (ref 1.5–7.7)
NEUTROPHILS # BLD AUTO: 4.89 X10(3) UL (ref 1.5–7.7)
NEUTROPHILS NFR BLD AUTO: 74.3 %
OSMOLALITY SERPL CALC.SUM OF ELEC: 299 MOSM/KG (ref 275–295)
PLATELET # BLD AUTO: 197 10(3)UL (ref 150–450)
POTASSIUM SERPL-SCNC: 4.4 MMOL/L (ref 3.5–5.1)
PROT SERPL-MCNC: 6.7 G/DL (ref 5.7–8.2)
RBC # BLD AUTO: 4.03 X10(6)UL (ref 3.8–5.8)
SODIUM SERPL-SCNC: 140 MMOL/L (ref 136–145)
WBC # BLD AUTO: 6.6 X10(3) UL (ref 4–11)

## 2025-06-11 RX ORDER — POTASSIUM CHLORIDE 1500 MG/1
20 TABLET, EXTENDED RELEASE ORAL DAILY
COMMUNITY

## 2025-06-11 RX ORDER — FUROSEMIDE 20 MG/1
20 TABLET ORAL DAILY
COMMUNITY

## 2025-06-11 NOTE — PROGRESS NOTES
[facility] Hematology Oncology Group Progress Note       Patient Name: Darryn Juarez   YOB: 1950  Medical Record Number: [id number]  Attending Physician: Gavin Cruz M.D.     The 21st Century Cures Act makes medical notes like these available to patients in the interest of transparency. Please be advised this is a medical document. Medical documents are intended to carry relevant information, facts as evident, and the clinical opinion of the practitioner. The medical note is intended as peer to peer communication and may appear blunt or direct. It is written in medical language and may contain abbreviations or verbiage that are unfamiliar.     Date of Visit: 6/11/2025       Chief Complaint  Chronic myelogenous leukemia - follow up.    Oncologic History  Darryn Juarez Sr. is a 75 year old male undergoing cardiac workup for coronary artery disease, dilated cardiomyopathy, and dyspnea on exertion who was found to have a markedly elevated white blood cell count on complete blood count. On 07/21/2017 complete blood count showed a WBC of 128 K/mcl with 40% neutrophils, 28% bands, 5% lymphocytes, 9% monocytes, 1% eosinophils, 3% basophils, 9 metamyelocytes, 4% myelocytes, 1% blasts; hemoglobin 9.5 g/dl with elevated MCV, and platelet count 117 K/mcl. 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 Pretreatment BCR/ABL was 0.41835 (ratio) and 41.7541%.    Patient was started on hydroxyurea while arrangements made for patient to receive nilotinib. At beginning of 09/2017 patient started nilotinib 300 mg bid. When white blood cell count fell below 80 K/mcl, hydroxyurea was discontinued.     History of Present Illness  Patient returns for follow up. He denies any new adverse effects to nilotinib.     Performance Status   Karnofsky 70% - Unable to do active work, but able to care for self.    Past Medical History (historical data, reviewed by physician)  Dilated cardiomyopathy with LVEF 52%; depression/anxiety; diabetes  mellitus; hypertension; hyperlipidemia; GERD; coronary artery disease with evidence of MI on stress test 11/2016.    Past Surgical History (historical data, reviewed by physician)  Cholecystectomy; excisional cervical lymph node biopsy.     Family History (historical data, reviewed by physician)  Brother with sarcoma; daughter with CNS lymphoma.     Social History (historical data, reviewed by physician)  Previous tobacco user but quit 1997; denies alcohol use.       Current Medications    furosemide 20 MG Oral Tab Take 1 tablet (20 mg total) by mouth daily.      potassium chloride 20 MEQ Oral Tab CR Take 1 tablet (20 mEq total) by mouth daily.      Nilotinib HCl 150 MG Oral Cap Take 300 mg by mouth 2 (two) times daily. 120 capsule 11    amLODIPine 5 MG Oral Tab Take 1 tablet (5 mg total) by mouth in the morning.      carvedilol 12.5 MG Oral Tab Take 1 tablet (12.5 mg total) by mouth in the morning and 1 tablet (12.5 mg total) before bedtime.      glimepiride 2 MG Oral Tab Take 1 tablet (2 mg total) by mouth every morning.      metFORMIN HCl 1000 MG Oral Tab Take 1 tablet (1,000 mg total) by mouth in the morning and 1 tablet (1,000 mg total) before bedtime.      carBAMazepine 100 MG Oral Chew Tab Chew 1 tablet (100 mg total) by mouth in the morning and 1 tablet (100 mg total) before bedtime.      QUEtiapine 200 MG Oral Tab Take 1 tablet (200 mg total) by mouth in the morning and 1 tablet (200 mg total) in the evening and 1 tablet (200 mg total) before bedtime. Total dose 250 mg .      QUEtiapine 50 MG Oral Tab Take 1 tablet (50 mg total) by mouth in the morning and 1 tablet (50 mg total) in the evening and 1 tablet (50 mg total) before bedtime. Total dose 250 mg .      lisinopril 10 MG Oral Tab Take 2 tablets (20 mg total) by mouth in the morning.      Polyethylene Glycol 3350 (MIRALAX) 17 GM/SCOOP Oral Powder Take 17 g by mouth in the morning.      Fenofibrate 145 MG Oral Tab Take 1 tablet (145 mg total) by mouth  in the morning.      allopurinol 300 MG Oral Tab Take 1 tablet (300 mg total) by mouth in the morning.      Pantoprazole Sodium 40 MG Oral Tab EC Take 1 tablet (40 mg total) by mouth in the morning and 1 tablet (40 mg total) in the evening. Take before meals.      folic acid 1 MG Oral Tab Take 1 tablet (1 mg total) by mouth in the morning.      Prazosin HCl 1 MG Oral Cap Take 1 capsule (1 mg total) by mouth in the morning and 1 capsule (1 mg total) before bedtime.      Prochlorperazine Maleate (COMPAZINE) 10 mg tablet Take 1 tablet (10 mg total) by mouth every 6 (six) hours as needed for Nausea. 30 tablet 3    Albuterol Sulfate  (90 Base) MCG/ACT Inhalation Aero Soln Inhale 2 puffs into the lungs every 6 (six) hours as needed for Wheezing. 1 Inhaler 0    DULoxetine HCl 60 MG Oral Cap DR Particles Take 1 capsule (60 mg total) by mouth in the morning.      aspirin 81 MG Oral Chew Tab Chew 1 tablet (81 mg total) by mouth in the morning. Took 4 this a.m.      Ondansetron HCl (ZOFRAN) 8 MG tablet Take 1 tablet (8 mg total) by mouth every 8 (eight) hours as needed for Nausea. 30 tablet 3    multiple vitamin Oral Chew Tab Chew 1 tablet by mouth in the morning.       Allergies   Mr. Juarez is allergic to haloperidol, tamsulosin, and primidone.     Vital Signs   There were no vitals taken for this visit.    Physical Examination   Constitutional      Well developed, well nourished. No apparent distress.   Head                   Normocephalic and atraumatic.  Eyes                   Conjunctiva clear; sclera anicteric.  ENMT                 External nose normal; external ears normal.  Respiratory          Normal effort; no respiratory distress; clear to auscultation bilaterally.   Cardiovascular     Regular rate and rhythm; normal S1S2.  Abdomen            Not distended.  Extremities          No lower extremity edema.  Neurologic           Motor and sensory grossly intact.    Laboratory   Recent Results (from the past 24  hours)   COMP METABOLIC PANEL [E]    Collection Time: 06/11/25  2:01 PM   Result Value Ref Range    Glucose 147 (H) 70 - 99 mg/dL    Sodium 140 136 - 145 mmol/L    Potassium 4.4 3.5 - 5.1 mmol/L    Chloride 105 98 - 112 mmol/L    CO2 28.0 21.0 - 32.0 mmol/L    Anion Gap 7 0 - 18 mmol/L    BUN 30 (H) 9 - 23 mg/dL    Creatinine 1.32 (H) 0.70 - 1.30 mg/dL    Calcium, Total 9.3 8.7 - 10.6 mg/dL    Calculated Osmolality 299 (H) 275 - 295 mOsm/kg    eGFR-Cr 56 (L) >=60 mL/min/1.73m2    AST 30 <34 U/L    ALT 25 10 - 49 U/L    Alkaline Phosphatase 63 45 - 117 U/L    Bilirubin, Total 0.2 0.2 - 1.1 mg/dL    Total Protein 6.7 5.7 - 8.2 g/dL    Albumin 4.4 3.2 - 4.8 g/dL    Globulin  2.3 2.0 - 3.5 g/dL    A/G Ratio 1.9 1.0 - 2.0    Patient Fasting for CMP? No    CBC W/DIFF [E]    Collection Time: 06/11/25  2:01 PM   Result Value Ref Range    WBC 6.6 4.0 - 11.0 x10(3) uL    RBC 4.03 3.80 - 5.80 x10(6)uL    HGB 11.0 (L) 13.0 - 17.5 g/dL    HCT 35.0 (L) 39.0 - 53.0 %    .0 150.0 - 450.0 10(3)uL    MCV 86.8 80.0 - 100.0 fL    MCH 27.3 26.0 - 34.0 pg    MCHC 31.4 31.0 - 37.0 g/dL    RDW 15.9 %    Neutrophil Absolute Prelim 4.89 1.50 - 7.70 x10 (3) uL    Neutrophil Absolute 4.89 1.50 - 7.70 x10(3) uL    Lymphocyte Absolute 1.10 1.00 - 4.00 x10(3) uL    Monocyte Absolute 0.37 0.10 - 1.00 x10(3) uL    Eosinophil Absolute 0.14 0.00 - 0.70 x10(3) uL    Basophil Absolute 0.03 0.00 - 0.20 x10(3) uL    Immature Granulocyte Absolute 0.05 0.00 - 1.00 x10(3) uL    Neutrophil % 74.3 %    Lymphocyte % 16.7 %    Monocyte % 5.6 %    Eosinophil % 2.1 %    Basophil % 0.5 %    Immature Granulocyte % 0.8 %     Impression and Plan   1.   Chronic myelogenous leukemia: Tolerating nivolumab well. Patient has been in MR3 consistently since 03/2024.           PCR for BCR/ABL at last visit was consistent with MMR3. Await results of PCR for BCR/ABL. If stable, there will be no recommended changes to his therapy.    Planned Follow Up   Patient will  return for follow up in 3 months; he will be contacted with his PCR results.    Electronically Signed by:     Gavin Cruz M.D.  System Medical Director, Oncology Services  [place] [facility]

## 2025-06-13 LAB
BCR-ABL1 INTERPRETATION: DETECTED
NS MOLECULAR RESPONSE VALUE: 3.7

## 2025-06-16 ENCOUNTER — TELEPHONE (OUTPATIENT)
Age: 75
End: 2025-06-16

## 2025-06-16 NOTE — TELEPHONE ENCOUNTER
Test(s) completed: BCR/ABL    Results:  per Dr Cruz \"BCR/ABL continues to show remission. Good results.\"    Plan: left patient a voicemail message